# Patient Record
Sex: FEMALE | Race: WHITE | NOT HISPANIC OR LATINO
[De-identification: names, ages, dates, MRNs, and addresses within clinical notes are randomized per-mention and may not be internally consistent; named-entity substitution may affect disease eponyms.]

---

## 2017-01-23 ENCOUNTER — APPOINTMENT (OUTPATIENT)
Dept: SURGERY | Facility: CLINIC | Age: 62
End: 2017-01-23

## 2017-02-07 ENCOUNTER — MEDICATION RENEWAL (OUTPATIENT)
Age: 62
End: 2017-02-07

## 2017-02-08 ENCOUNTER — MEDICATION RENEWAL (OUTPATIENT)
Age: 62
End: 2017-02-08

## 2017-02-08 RX ORDER — FLUTICASONE PROPIONATE 50 UG/1
50 SPRAY, METERED NASAL TWICE DAILY
Qty: 3 | Refills: 1 | Status: DISCONTINUED | COMMUNITY
Start: 2017-02-07 | End: 2017-02-08

## 2017-02-26 ENCOUNTER — RX RENEWAL (OUTPATIENT)
Age: 62
End: 2017-02-26

## 2017-04-03 ENCOUNTER — APPOINTMENT (OUTPATIENT)
Dept: MRI IMAGING | Facility: CLINIC | Age: 62
End: 2017-04-03

## 2017-04-03 ENCOUNTER — OUTPATIENT (OUTPATIENT)
Dept: OUTPATIENT SERVICES | Facility: HOSPITAL | Age: 62
LOS: 1 days | End: 2017-04-03
Payer: MEDICARE

## 2017-04-03 DIAGNOSIS — Z00.8 ENCOUNTER FOR OTHER GENERAL EXAMINATION: ICD-10-CM

## 2017-04-04 PROCEDURE — A9585: CPT

## 2017-04-04 PROCEDURE — C8908: CPT

## 2017-04-04 PROCEDURE — 82565 ASSAY OF CREATININE: CPT

## 2017-04-04 PROCEDURE — C8937: CPT

## 2017-04-06 ENCOUNTER — APPOINTMENT (OUTPATIENT)
Dept: PULMONOLOGY | Facility: CLINIC | Age: 62
End: 2017-04-06

## 2017-04-06 ENCOUNTER — RESULT REVIEW (OUTPATIENT)
Age: 62
End: 2017-04-06

## 2017-04-06 VITALS
OXYGEN SATURATION: 96 % | BODY MASS INDEX: 31.58 KG/M2 | HEART RATE: 74 BPM | RESPIRATION RATE: 17 BRPM | DIASTOLIC BLOOD PRESSURE: 70 MMHG | HEIGHT: 64 IN | SYSTOLIC BLOOD PRESSURE: 120 MMHG | WEIGHT: 185 LBS

## 2017-04-06 RX ORDER — LEVOTHYROXINE SODIUM 112 UG/1
112 CAPSULE ORAL
Qty: 84 | Refills: 0 | Status: ACTIVE | COMMUNITY
Start: 2017-02-26

## 2017-04-06 RX ORDER — AMOXICILLIN 500 MG/1
500 CAPSULE ORAL
Qty: 21 | Refills: 0 | Status: DISCONTINUED | COMMUNITY
Start: 2017-01-28

## 2017-04-07 ENCOUNTER — OUTPATIENT (OUTPATIENT)
Dept: OUTPATIENT SERVICES | Facility: HOSPITAL | Age: 62
LOS: 1 days | End: 2017-04-07
Payer: MEDICARE

## 2017-04-07 ENCOUNTER — APPOINTMENT (OUTPATIENT)
Dept: MRI IMAGING | Facility: CLINIC | Age: 62
End: 2017-04-07

## 2017-04-07 DIAGNOSIS — Z00.8 ENCOUNTER FOR OTHER GENERAL EXAMINATION: ICD-10-CM

## 2017-04-07 PROCEDURE — 77065 DX MAMMO INCL CAD UNI: CPT

## 2017-04-07 PROCEDURE — A9585: CPT

## 2017-04-07 PROCEDURE — A4648: CPT

## 2017-04-07 PROCEDURE — 19085 BX BREAST 1ST LESION MR IMAG: CPT

## 2017-04-07 PROCEDURE — 82565 ASSAY OF CREATININE: CPT

## 2017-04-12 DIAGNOSIS — R92.8 OTHER ABNORMAL AND INCONCLUSIVE FINDINGS ON DIAGNOSTIC IMAGING OF BREAST: ICD-10-CM

## 2017-04-12 DIAGNOSIS — N64.89 OTHER SPECIFIED DISORDERS OF BREAST: ICD-10-CM

## 2017-04-16 ENCOUNTER — RX RENEWAL (OUTPATIENT)
Age: 62
End: 2017-04-16

## 2017-04-20 ENCOUNTER — RX RENEWAL (OUTPATIENT)
Age: 62
End: 2017-04-20

## 2017-06-19 ENCOUNTER — RX RENEWAL (OUTPATIENT)
Age: 62
End: 2017-06-19

## 2017-06-29 ENCOUNTER — APPOINTMENT (OUTPATIENT)
Dept: PULMONOLOGY | Facility: CLINIC | Age: 62
End: 2017-06-29

## 2017-06-29 VITALS
WEIGHT: 192 LBS | HEIGHT: 64 IN | BODY MASS INDEX: 32.78 KG/M2 | RESPIRATION RATE: 16 BRPM | DIASTOLIC BLOOD PRESSURE: 74 MMHG | SYSTOLIC BLOOD PRESSURE: 120 MMHG | OXYGEN SATURATION: 95 % | HEART RATE: 76 BPM

## 2017-06-29 DIAGNOSIS — Z86.39 PERSONAL HISTORY OF OTHER ENDOCRINE, NUTRITIONAL AND METABOLIC DISEASE: ICD-10-CM

## 2017-07-11 ENCOUNTER — RX RENEWAL (OUTPATIENT)
Age: 62
End: 2017-07-11

## 2017-07-12 LAB
CA-I SERPL-SCNC: 1.33 MMOL/L
CALCIUM SERPL-MCNC: 10.3 MG/DL

## 2017-07-13 LAB
CALCIUM SERPL-MCNC: 10.3 MG/DL
PARATHYROID HORMONE INTACT: 18 PG/ML

## 2017-07-19 ENCOUNTER — RESULT REVIEW (OUTPATIENT)
Age: 62
End: 2017-07-19

## 2017-08-09 ENCOUNTER — RX RENEWAL (OUTPATIENT)
Age: 62
End: 2017-08-09

## 2017-08-10 ENCOUNTER — APPOINTMENT (OUTPATIENT)
Dept: PULMONOLOGY | Facility: CLINIC | Age: 62
End: 2017-08-10

## 2017-09-29 ENCOUNTER — TRANSCRIPTION ENCOUNTER (OUTPATIENT)
Age: 62
End: 2017-09-29

## 2017-10-02 ENCOUNTER — RX RENEWAL (OUTPATIENT)
Age: 62
End: 2017-10-02

## 2017-10-06 ENCOUNTER — APPOINTMENT (OUTPATIENT)
Dept: MAMMOGRAPHY | Facility: CLINIC | Age: 62
End: 2017-10-06

## 2017-10-06 ENCOUNTER — APPOINTMENT (OUTPATIENT)
Dept: ULTRASOUND IMAGING | Facility: CLINIC | Age: 62
End: 2017-10-06

## 2017-10-06 ENCOUNTER — OUTPATIENT (OUTPATIENT)
Dept: OUTPATIENT SERVICES | Facility: HOSPITAL | Age: 62
LOS: 1 days | End: 2017-10-06
Payer: MEDICARE

## 2017-10-06 DIAGNOSIS — Z00.8 ENCOUNTER FOR OTHER GENERAL EXAMINATION: ICD-10-CM

## 2017-10-06 PROCEDURE — 77063 BREAST TOMOSYNTHESIS BI: CPT

## 2017-10-06 PROCEDURE — 76641 ULTRASOUND BREAST COMPLETE: CPT | Mod: 26,50

## 2017-10-06 PROCEDURE — 77067 SCR MAMMO BI INCL CAD: CPT

## 2017-10-06 PROCEDURE — G0202: CPT | Mod: 26

## 2017-10-06 PROCEDURE — 77063 BREAST TOMOSYNTHESIS BI: CPT | Mod: 26

## 2017-10-06 PROCEDURE — 76641 ULTRASOUND BREAST COMPLETE: CPT

## 2017-11-06 ENCOUNTER — RX RENEWAL (OUTPATIENT)
Age: 62
End: 2017-11-06

## 2017-11-06 RX ORDER — ALBUTEROL SULFATE 90 UG/1
108 (90 BASE) AEROSOL, METERED RESPIRATORY (INHALATION)
Qty: 18 | Refills: 0 | Status: ACTIVE | COMMUNITY
Start: 2017-11-06 | End: 1900-01-01

## 2017-11-10 ENCOUNTER — APPOINTMENT (OUTPATIENT)
Dept: PULMONOLOGY | Facility: CLINIC | Age: 62
End: 2017-11-10
Payer: MEDICARE

## 2017-11-10 VITALS
SYSTOLIC BLOOD PRESSURE: 126 MMHG | DIASTOLIC BLOOD PRESSURE: 80 MMHG | BODY MASS INDEX: 32.78 KG/M2 | OXYGEN SATURATION: 98 % | WEIGHT: 192 LBS | HEART RATE: 91 BPM | HEIGHT: 64 IN

## 2017-11-10 DIAGNOSIS — Z87.01 PERSONAL HISTORY OF PNEUMONIA (RECURRENT): ICD-10-CM

## 2017-11-10 PROCEDURE — 94010 BREATHING CAPACITY TEST: CPT

## 2017-11-10 PROCEDURE — 99214 OFFICE O/P EST MOD 30 MIN: CPT | Mod: 25

## 2017-11-10 PROCEDURE — 95012 NITRIC OXIDE EXP GAS DETER: CPT

## 2017-11-10 RX ORDER — LEVALBUTEROL TARTRATE 45 UG/1
45 AEROSOL, METERED ORAL
Qty: 3 | Refills: 2 | Status: ACTIVE | COMMUNITY
Start: 2017-11-10 | End: 1900-01-01

## 2017-11-15 ENCOUNTER — RX RENEWAL (OUTPATIENT)
Age: 62
End: 2017-11-15

## 2018-01-18 ENCOUNTER — RX RENEWAL (OUTPATIENT)
Age: 63
End: 2018-01-18

## 2018-01-22 ENCOUNTER — APPOINTMENT (OUTPATIENT)
Dept: SURGERY | Facility: CLINIC | Age: 63
End: 2018-01-22
Payer: MEDICARE

## 2018-01-22 PROCEDURE — 99213K: CUSTOM

## 2018-02-14 ENCOUNTER — RX RENEWAL (OUTPATIENT)
Age: 63
End: 2018-02-14

## 2018-03-07 ENCOUNTER — APPOINTMENT (OUTPATIENT)
Dept: PULMONOLOGY | Facility: CLINIC | Age: 63
End: 2018-03-07
Payer: MEDICARE

## 2018-03-07 VITALS
BODY MASS INDEX: 34.2 KG/M2 | HEART RATE: 73 BPM | DIASTOLIC BLOOD PRESSURE: 70 MMHG | HEIGHT: 63 IN | WEIGHT: 193 LBS | RESPIRATION RATE: 14 BRPM | SYSTOLIC BLOOD PRESSURE: 110 MMHG | OXYGEN SATURATION: 96 %

## 2018-03-07 DIAGNOSIS — Z87.09 PERSONAL HISTORY OF OTHER DISEASES OF THE RESPIRATORY SYSTEM: ICD-10-CM

## 2018-03-07 PROCEDURE — 99214 OFFICE O/P EST MOD 30 MIN: CPT

## 2018-03-07 RX ORDER — METRONIDAZOLE 7.5 MG/G
0.75 GEL VAGINAL
Qty: 210 | Refills: 0 | Status: COMPLETED | COMMUNITY
Start: 2017-11-10

## 2018-03-07 RX ORDER — OSELTAMIVIR PHOSPHATE 75 MG/1
75 CAPSULE ORAL
Qty: 10 | Refills: 0 | Status: COMPLETED | COMMUNITY
Start: 2018-01-23

## 2018-03-08 ENCOUNTER — RX RENEWAL (OUTPATIENT)
Age: 63
End: 2018-03-08

## 2018-03-16 ENCOUNTER — APPOINTMENT (OUTPATIENT)
Dept: PULMONOLOGY | Facility: CLINIC | Age: 63
End: 2018-03-16
Payer: MEDICARE

## 2018-03-16 VITALS
OXYGEN SATURATION: 93 % | WEIGHT: 191 LBS | SYSTOLIC BLOOD PRESSURE: 115 MMHG | BODY MASS INDEX: 33.84 KG/M2 | HEIGHT: 63 IN | DIASTOLIC BLOOD PRESSURE: 65 MMHG | RESPIRATION RATE: 14 BRPM | HEART RATE: 71 BPM

## 2018-03-16 PROCEDURE — 99214 OFFICE O/P EST MOD 30 MIN: CPT | Mod: 25

## 2018-03-16 PROCEDURE — 94010 BREATHING CAPACITY TEST: CPT

## 2018-03-16 RX ORDER — CLARITHROMYCIN 500 MG/1
500 TABLET, FILM COATED ORAL
Qty: 20 | Refills: 0 | Status: DISCONTINUED | COMMUNITY
Start: 2018-03-07 | End: 2018-03-16

## 2018-03-16 RX ORDER — PREDNISONE 10 MG/1
10 TABLET ORAL
Qty: 1 | Refills: 0 | Status: DISCONTINUED | COMMUNITY
Start: 2018-03-07 | End: 2018-03-16

## 2018-03-16 RX ORDER — AZITHROMYCIN 250 MG/1
250 TABLET, FILM COATED ORAL
Qty: 4 | Refills: 0 | Status: DISCONTINUED | COMMUNITY
Start: 2017-06-23 | End: 2018-03-16

## 2018-03-16 RX ORDER — PREDNISONE 10 MG/1
10 TABLET ORAL
Qty: 1 | Refills: 0 | Status: DISCONTINUED | COMMUNITY
Start: 2017-06-29 | End: 2018-03-16

## 2018-03-16 RX ORDER — CEFUROXIME AXETIL 500 MG/1
500 TABLET ORAL
Qty: 20 | Refills: 0 | Status: DISCONTINUED | COMMUNITY
Start: 2017-06-23 | End: 2018-03-16

## 2018-03-16 RX ORDER — NITROFURANTOIN (MONOHYDRATE/MACROCRYSTALS) 25; 75 MG/1; MG/1
100 CAPSULE ORAL
Qty: 14 | Refills: 0 | Status: DISCONTINUED | COMMUNITY
Start: 2017-05-23 | End: 2018-03-16

## 2018-04-09 ENCOUNTER — APPOINTMENT (OUTPATIENT)
Dept: MRI IMAGING | Facility: CLINIC | Age: 63
End: 2018-04-09

## 2018-04-09 ENCOUNTER — OUTPATIENT (OUTPATIENT)
Dept: OUTPATIENT SERVICES | Facility: HOSPITAL | Age: 63
LOS: 1 days | End: 2018-04-09
Payer: MEDICARE

## 2018-04-09 DIAGNOSIS — Z00.8 ENCOUNTER FOR OTHER GENERAL EXAMINATION: ICD-10-CM

## 2018-04-09 PROCEDURE — 77059 MRI BREAST BILATERAL: CPT | Mod: 26

## 2018-04-09 PROCEDURE — 0159T: CPT | Mod: 26

## 2018-04-09 PROCEDURE — C8937: CPT

## 2018-04-09 PROCEDURE — C8908: CPT

## 2018-04-09 PROCEDURE — A9585: CPT

## 2018-04-12 ENCOUNTER — RX RENEWAL (OUTPATIENT)
Age: 63
End: 2018-04-12

## 2018-04-30 ENCOUNTER — RX RENEWAL (OUTPATIENT)
Age: 63
End: 2018-04-30

## 2018-06-12 ENCOUNTER — RX RENEWAL (OUTPATIENT)
Age: 63
End: 2018-06-12

## 2018-06-25 ENCOUNTER — RX RENEWAL (OUTPATIENT)
Age: 63
End: 2018-06-25

## 2018-06-27 ENCOUNTER — RX RENEWAL (OUTPATIENT)
Age: 63
End: 2018-06-27

## 2018-06-27 RX ORDER — LEVALBUTEROL TARTRATE 45 UG/1
45 AEROSOL, METERED ORAL
Qty: 45 | Refills: 2 | Status: ACTIVE | COMMUNITY
Start: 2018-06-27 | End: 1900-01-01

## 2018-07-13 ENCOUNTER — APPOINTMENT (OUTPATIENT)
Dept: PULMONOLOGY | Facility: CLINIC | Age: 63
End: 2018-07-13
Payer: MEDICARE

## 2018-07-13 ENCOUNTER — NON-APPOINTMENT (OUTPATIENT)
Age: 63
End: 2018-07-13

## 2018-07-13 VITALS
HEART RATE: 84 BPM | BODY MASS INDEX: 34.55 KG/M2 | DIASTOLIC BLOOD PRESSURE: 70 MMHG | WEIGHT: 195 LBS | HEIGHT: 63 IN | OXYGEN SATURATION: 92 % | SYSTOLIC BLOOD PRESSURE: 120 MMHG

## 2018-07-13 PROCEDURE — 99214 OFFICE O/P EST MOD 30 MIN: CPT | Mod: 25

## 2018-07-13 PROCEDURE — 94010 BREATHING CAPACITY TEST: CPT

## 2018-07-13 PROCEDURE — 95012 NITRIC OXIDE EXP GAS DETER: CPT

## 2018-07-18 ENCOUNTER — RX RENEWAL (OUTPATIENT)
Age: 63
End: 2018-07-18

## 2018-07-18 ENCOUNTER — MEDICATION RENEWAL (OUTPATIENT)
Age: 63
End: 2018-07-18

## 2018-08-27 ENCOUNTER — RX RENEWAL (OUTPATIENT)
Age: 63
End: 2018-08-27

## 2018-10-01 ENCOUNTER — APPOINTMENT (OUTPATIENT)
Dept: ULTRASOUND IMAGING | Facility: CLINIC | Age: 63
End: 2018-10-01
Payer: MEDICARE

## 2018-10-01 ENCOUNTER — OUTPATIENT (OUTPATIENT)
Dept: OUTPATIENT SERVICES | Facility: HOSPITAL | Age: 63
LOS: 1 days | End: 2018-10-01
Payer: MEDICARE

## 2018-10-01 ENCOUNTER — APPOINTMENT (OUTPATIENT)
Dept: MAMMOGRAPHY | Facility: CLINIC | Age: 63
End: 2018-10-01
Payer: MEDICARE

## 2018-10-01 DIAGNOSIS — Z00.8 ENCOUNTER FOR OTHER GENERAL EXAMINATION: ICD-10-CM

## 2018-10-01 PROCEDURE — 76641 ULTRASOUND BREAST COMPLETE: CPT

## 2018-10-01 PROCEDURE — 77063 BREAST TOMOSYNTHESIS BI: CPT | Mod: 26

## 2018-10-01 PROCEDURE — 76641 ULTRASOUND BREAST COMPLETE: CPT | Mod: 26,50

## 2018-10-01 PROCEDURE — 77063 BREAST TOMOSYNTHESIS BI: CPT

## 2018-10-01 PROCEDURE — 77067 SCR MAMMO BI INCL CAD: CPT

## 2018-10-01 PROCEDURE — 77067 SCR MAMMO BI INCL CAD: CPT | Mod: 26

## 2018-10-18 ENCOUNTER — APPOINTMENT (OUTPATIENT)
Dept: PAIN MANAGEMENT | Facility: CLINIC | Age: 63
End: 2018-10-18
Payer: MEDICARE

## 2018-10-18 VITALS
BODY MASS INDEX: 34.73 KG/M2 | WEIGHT: 196 LBS | HEART RATE: 88 BPM | HEIGHT: 63 IN | DIASTOLIC BLOOD PRESSURE: 77 MMHG | SYSTOLIC BLOOD PRESSURE: 126 MMHG

## 2018-10-18 PROCEDURE — 99204 OFFICE O/P NEW MOD 45 MIN: CPT

## 2018-10-22 ENCOUNTER — APPOINTMENT (OUTPATIENT)
Dept: PULMONOLOGY | Facility: CLINIC | Age: 63
End: 2018-10-22
Payer: MEDICARE

## 2018-10-22 VITALS
WEIGHT: 193 LBS | DIASTOLIC BLOOD PRESSURE: 58 MMHG | RESPIRATION RATE: 17 BRPM | SYSTOLIC BLOOD PRESSURE: 138 MMHG | HEART RATE: 88 BPM | OXYGEN SATURATION: 97 % | HEIGHT: 64 IN | BODY MASS INDEX: 32.95 KG/M2

## 2018-10-22 PROCEDURE — 99214 OFFICE O/P EST MOD 30 MIN: CPT | Mod: 25

## 2018-10-22 PROCEDURE — 71046 X-RAY EXAM CHEST 2 VIEWS: CPT

## 2018-11-05 ENCOUNTER — APPOINTMENT (OUTPATIENT)
Dept: PULMONOLOGY | Facility: CLINIC | Age: 63
End: 2018-11-05

## 2018-11-16 ENCOUNTER — APPOINTMENT (OUTPATIENT)
Dept: PULMONOLOGY | Facility: CLINIC | Age: 63
End: 2018-11-16
Payer: MEDICARE

## 2018-11-16 ENCOUNTER — NON-APPOINTMENT (OUTPATIENT)
Age: 63
End: 2018-11-16

## 2018-11-16 VITALS
OXYGEN SATURATION: 95 % | DIASTOLIC BLOOD PRESSURE: 70 MMHG | HEART RATE: 90 BPM | WEIGHT: 195 LBS | HEIGHT: 64 IN | SYSTOLIC BLOOD PRESSURE: 120 MMHG | BODY MASS INDEX: 33.29 KG/M2

## 2018-11-16 PROCEDURE — 99214 OFFICE O/P EST MOD 30 MIN: CPT | Mod: 25

## 2018-11-16 PROCEDURE — 94010 BREATHING CAPACITY TEST: CPT

## 2018-11-16 PROCEDURE — 95012 NITRIC OXIDE EXP GAS DETER: CPT

## 2018-12-06 ENCOUNTER — APPOINTMENT (OUTPATIENT)
Dept: PAIN MANAGEMENT | Facility: CLINIC | Age: 63
End: 2018-12-06

## 2018-12-18 ENCOUNTER — RX RENEWAL (OUTPATIENT)
Age: 63
End: 2018-12-18

## 2019-01-16 ENCOUNTER — RX RENEWAL (OUTPATIENT)
Age: 64
End: 2019-01-16

## 2019-01-28 ENCOUNTER — APPOINTMENT (OUTPATIENT)
Dept: SURGERY | Facility: CLINIC | Age: 64
End: 2019-01-28
Payer: MEDICARE

## 2019-01-28 PROCEDURE — 99213K: CUSTOM

## 2019-02-19 ENCOUNTER — RX RENEWAL (OUTPATIENT)
Age: 64
End: 2019-02-19

## 2019-03-04 ENCOUNTER — RX RENEWAL (OUTPATIENT)
Age: 64
End: 2019-03-04

## 2019-03-15 ENCOUNTER — APPOINTMENT (OUTPATIENT)
Dept: PULMONOLOGY | Facility: CLINIC | Age: 64
End: 2019-03-15
Payer: MEDICARE

## 2019-03-15 ENCOUNTER — NON-APPOINTMENT (OUTPATIENT)
Age: 64
End: 2019-03-15

## 2019-03-15 VITALS
BODY MASS INDEX: 33.29 KG/M2 | SYSTOLIC BLOOD PRESSURE: 122 MMHG | HEIGHT: 64 IN | DIASTOLIC BLOOD PRESSURE: 72 MMHG | HEART RATE: 77 BPM | OXYGEN SATURATION: 94 % | RESPIRATION RATE: 17 BRPM | WEIGHT: 195 LBS

## 2019-03-15 PROCEDURE — 94010 BREATHING CAPACITY TEST: CPT

## 2019-03-15 PROCEDURE — 99214 OFFICE O/P EST MOD 30 MIN: CPT | Mod: 25

## 2019-03-15 NOTE — ADDENDUM
[FreeTextEntry1] : All medical record entries made by shlomo Wilkinson were at Dr. Korey Bautista's, direction and personally dictated by me on 03/15/2019. I have reviewed the chart and agree that the record accurately reflects my personal performance of the history, physical exam, assessment and plan. I have also personally directed, reviewed, and agree with the discharge instructions.

## 2019-03-15 NOTE — ASSESSMENT
[FreeTextEntry1] : Ms. Smith has allergic (IgE mediated) and eosinophilic asthma, allergies, OW, urticaria, GERD, and OSAS. She is now s/p acute sinusitis. She is currently stable from a pulmonary perspective except for poor sleep. \par \par problem 1: moderate persistent asthma (flair) \par -continue Levalbuterol via Hand held nebulizer, Q6H\par -continue Advair  at 2 inhalations BID\par -continue to use Spiriva 1 inhalation QD\par -continue Qvar Redihaler 80 2 puffs BID \par -continue to use Singulair 10 mg before bed \par -rescue inhaler PRN \par \par Asthma is believed to be caused by inherited (genetic) and environmental factor, but its exact cause is unknown. Asthma may be triggered by allergens, lung infections, or irritants in the air. Asthma triggers are different for each person \par -Inhaler technique reviewed as well as oral hygiene techniques reviewed with patient. Avoidance of cold air, extremes of temperature, rescue inhaler should be used before exercise. Order of medication reviewed with patient. Recommended use of a cool mist humidifier in the bedroom. \par \par problem 2: eosinophilic asthma \par -Candidate for Nucala / Fasenra\par -The safety and efficacy of Nucala was established in three double-blind, randomized, placebo-controlled trials in patients with severe asthma. Compared to a placebo, patients with severe asthma receiving Nucala had fewer exacerbation requiring hospitalization and/or emergency department visits, and a longer time to first exacerbation. In addition, patients with severe asthma receiving Nucala or Fasenra experienced greater reductions in their daily maintenance oral corticosteroid dose, while maintaining asthma control compared with patients receiving placebo. Treatment with Nucala did not result in a significant improvement in lung function, as measured by the volume of air exhaled by patients in one second. The most common side effects include: headache, injection site reactions, back pain, weakness, and fatigue; hypersensitivity reactions can occur within hours or days including swelling of the face, mouth, and tongue, fainting, dizziness, hives, breathing problems, and rash; herpes zoster infections have occurred. The drug is a monoclonal antibody that inhibits interleukin -5 which helps regular eosinophils, a type of white blood cell that contributes to asthma. The over-production of eosinophils can cause inflammation in the lungs, increasing the frequency of asthma attacks. Patients must also take other medications, including high dose inhaled corticosteroids and at least one additional asthma drug\par \par problem 3: allergic (IgE mediated) asthma\par -continue to use Xolair 225 q monthly\par -Xolair is a recombinant DNA- derived humanized IgG1K monoclonal antibody that selectively binds ot human immunoglobulin E (IgE). Xolair is produced by a Chinese hamster ovary cell suspension culture in nutrient medium containing the antibiotic gentamicin. Gentamicin is not detectable in the final product. Xolair is a sterile, white, preservative free, lyophilized powder contained in a single use vial that is reconstituted with sterile water for suspension. Side effects include: wheezing, tightness of the chest, trouble breathing, hives, skin rash, feeling anxious or light-headed, fainting, warmth or tingling under skin, or swelling of face, lips, or tongue \par \par problem 4: GERD\par - Add Zantac 300 mg QHS\par -continue generic Nexium 40 mg before breakfast\par -Rule of 2s: avoid eating too much, eating too late, eating too spicy, eating two hours before bed\par -Things to avoid including overeating, spicy foods, tight clothing, eating within three hours of bed, this list is not all inclusive. \par -For treatment of reflux, possible options discussed including diet control, H2 blockers, PPIs, as well as coating motility agents discussed as treatment options. Timing of meals and proximity of last meal to sleep were discussed. If symptoms persist, a formal gastrointestinal evaluation is needed. \par \par problem 5: allergic rhinitis\par -Olopatadine 0.6% 1 sniff each nostril BID \par -continue to use Veramyst 1 sniff each nostril BID\par -continue Xyzal 5 mg before bed \par \par -Environmental measures for allergies were encouraged including mattress and pillow cover, air purifier, and environmental controls. \par \par problem 6: KRISTA\par -continue to use CPAP machine - tolerating it well and seeing benefits\par -add Insomnitol \par \par -Sleep apnea is associated with adverse clinical consequences which an affect most organ systems. Cardiovascular disease risk includes arrhythmias, atrial fibrillation, hypertension, coronary artery disease, and stroke. Metabolic disorders include diabetes type 2, non-alcoholic fatty liver disease. Mood disorder especially depression; and cognitive decline especially in the elderly. Associations with chronic reflux/Dennis’s esophagus some but not all inclusive. \par -Reasons to assess this include arousal consistent with hypopnea; respiratory events most prominent in REM sleep or supine position; therefore sleep staging and body position are important for accurate diagnosis and estimation of AHI. \par \par problem 7: leg pain (improved)\par -recommended to continue f/u with Dr. Cheyanne Yousif\par -recommended continue to use L-carnitine\par -being added CoQ-10 at 200 mg\par -being added vitamin D 50,000 units once a month\par \par problem 8: obesity\par -Weight loss, exercise, and diet control were discussed and are highly encouraged. Treatment options were given such as, aqua therapy, and contacting a nutritionist. Recommended to use the elliptical, stationary bike, less use of treadmill. Obesity is associated with worsening asthma, shortness of breath, and potential for cardiac disease, diabetes, and other underlying medical conditions. \par -recommended to consider yoga\par \par problem 9: idiopathic urticaria\par - It is recommended that she hold all of her supplements and then restart them one at a time. \par -continue to use Xolair 225 q monthly \par \par problem 10: fatigue\par -recommended to go gluten free\par -recommended to look at number of hours she is sleeping as well as her CPAP pressures \par \par problem 11: health maintenance \par -s/p flu shot - 2018\par -recommended strep pneumonia vaccines: Prevnar-13 vaccine, followed by Pneumo vaccine 23 one year following\par -recommended early intervention for URIs\par -recommended regular osteoporosis evaluations\par -recommended early dermatological evaluations\par -recommended after the age of 50 to the age of 70, colonoscopy every 5 years \par  \par \par F/U in 4 months\par She is encouraged to call with any changes, concerns, or questions. \par

## 2019-03-15 NOTE — PROCEDURE
[FreeTextEntry1] : PFT - spi reveals normal flows; FEV1 is 2.11 which is 85% of predicted, normal flow volume loop \par \par Blood work (November 20180 reveals Eosinophil count = 350 (35%)

## 2019-03-15 NOTE — REASON FOR VISIT
[Follow-Up] : a follow-up visit [FreeTextEntry1] : acid reflux disease, allergic rhinitis, asthma, cough, low vitamin D, obesity, KRISTA, and sore throat

## 2019-03-18 ENCOUNTER — RX RENEWAL (OUTPATIENT)
Age: 64
End: 2019-03-18

## 2019-04-10 ENCOUNTER — APPOINTMENT (OUTPATIENT)
Dept: MRI IMAGING | Facility: CLINIC | Age: 64
End: 2019-04-10
Payer: MEDICARE

## 2019-04-10 ENCOUNTER — OUTPATIENT (OUTPATIENT)
Dept: OUTPATIENT SERVICES | Facility: HOSPITAL | Age: 64
LOS: 1 days | End: 2019-04-10
Payer: MEDICARE

## 2019-04-10 DIAGNOSIS — Z00.8 ENCOUNTER FOR OTHER GENERAL EXAMINATION: ICD-10-CM

## 2019-04-10 PROCEDURE — 77049 MRI BREAST C-+ W/CAD BI: CPT | Mod: 26

## 2019-04-10 PROCEDURE — C8937: CPT

## 2019-04-10 PROCEDURE — C8908: CPT

## 2019-04-10 PROCEDURE — A9585: CPT

## 2019-05-13 ENCOUNTER — RX RENEWAL (OUTPATIENT)
Age: 64
End: 2019-05-13

## 2019-06-10 ENCOUNTER — RX RENEWAL (OUTPATIENT)
Age: 64
End: 2019-06-10

## 2019-07-11 ENCOUNTER — APPOINTMENT (OUTPATIENT)
Dept: PULMONOLOGY | Facility: CLINIC | Age: 64
End: 2019-07-11
Payer: MEDICARE

## 2019-07-11 ENCOUNTER — NON-APPOINTMENT (OUTPATIENT)
Age: 64
End: 2019-07-11

## 2019-07-11 VITALS
DIASTOLIC BLOOD PRESSURE: 64 MMHG | HEART RATE: 79 BPM | HEIGHT: 64 IN | WEIGHT: 193 LBS | SYSTOLIC BLOOD PRESSURE: 118 MMHG | BODY MASS INDEX: 32.95 KG/M2 | OXYGEN SATURATION: 94 %

## 2019-07-11 PROCEDURE — 99214 OFFICE O/P EST MOD 30 MIN: CPT | Mod: 25

## 2019-07-11 PROCEDURE — 95012 NITRIC OXIDE EXP GAS DETER: CPT

## 2019-07-11 NOTE — REASON FOR VISIT
[Follow-Up] : a follow-up visit [FreeTextEntry1] : allergic rhinitis, asthma, GERD, obesity, low vitamin D, KRISTA, eosinophilic asthma, and cough

## 2019-07-11 NOTE — PROCEDURE
[FreeTextEntry1] : PFT - spi reveals normal flows; FEV1 is 2.19 which is 89% of predicted, normal flow volume loop \par \par FENO was 25 ; a normal value being less than 25\par \par Fractional exhaled nitric oxide (FENO) is regarded as a simple, noninvasive method for assessing eosinophilic airway inflammation. Produced by a variety of cells within the lung, nitric oxide (NO) concentrations are generally low in healthy individuals. However, high concentrations of NO appear to be involved in nonspecific host defense mechanisms and chronic inflammatory diseases such as asthma. The American Thoracic Society (ATS) therefore has recommended using FENO to aid in the diagnosis and monitoring of eosinophilic airway inflammation and asthma, and for identifying steroid responsive individuals whose chronic respiratory symptoms may be caused by airway inflammation.

## 2019-07-11 NOTE — ASSESSMENT
[FreeTextEntry1] : Ms. Smith has allergic (IgE mediated) and eosinophilic asthma, allergies, OW, urticaria, GERD, and OSAS.  She is currently stable from a pulmonary perspective except for poor sleep. \par \par problem 1: moderate persistent asthma \par -continue Levalbuterol via Hand held nebulizer, Q6H\par -continue Advair  at 2 inhalations BID\par -continue to use Spiriva 1 inhalation QD\par -continue Qvar Redihaler 80 2 puffs BID \par -continue to use Singulair 10 mg before bed \par -rescue inhaler PRN \par \par Asthma is believed to be caused by inherited (genetic) and environmental factor, but its exact cause is unknown. Asthma may be triggered by allergens, lung infections, or irritants in the air. Asthma triggers are different for each person \par -Inhaler technique reviewed as well as oral hygiene techniques reviewed with patient. Avoidance of cold air, extremes of temperature, rescue inhaler should be used before exercise. Order of medication reviewed with patient. Recommended use of a cool mist humidifier in the bedroom. \par \par problem 2: eosinophilic asthma \par -Candidate for Nucala / Fasenra\par -The safety and efficacy of Nucala was established in three double-blind, randomized, placebo-controlled trials in patients with severe asthma. Compared to a placebo, patients with severe asthma receiving Nucala had fewer exacerbation requiring hospitalization and/or emergency department visits, and a longer time to first exacerbation. In addition, patients with severe asthma receiving Nucala or Fasenra experienced greater reductions in their daily maintenance oral corticosteroid dose, while maintaining asthma control compared with patients receiving placebo. Treatment with Nucala did not result in a significant improvement in lung function, as measured by the volume of air exhaled by patients in one second. The most common side effects include: headache, injection site reactions, back pain, weakness, and fatigue; hypersensitivity reactions can occur within hours or days including swelling of the face, mouth, and tongue, fainting, dizziness, hives, breathing problems, and rash; herpes zoster infections have occurred. The drug is a monoclonal antibody that inhibits interleukin -5 which helps regular eosinophils, a type of white blood cell that contributes to asthma. The over-production of eosinophils can cause inflammation in the lungs, increasing the frequency of asthma attacks. Patients must also take other medications, including high dose inhaled corticosteroids and at least one additional asthma drug\par \par problem 3: allergic (IgE mediated) asthma\par -continue to use Xolair 225 q monthly\par -Xolair is a recombinant DNA- derived humanized IgG1K monoclonal antibody that selectively binds ot human immunoglobulin E (IgE). Xolair is produced by a Chinese hamster ovary cell suspension culture in nutrient medium containing the antibiotic gentamicin. Gentamicin is not detectable in the final product. Xolair is a sterile, white, preservative free, lyophilized powder contained in a single use vial that is reconstituted with sterile water for suspension. Side effects include: wheezing, tightness of the chest, trouble breathing, hives, skin rash, feeling anxious or light-headed, fainting, warmth or tingling under skin, or swelling of face, lips, or tongue \par \par problem 4: GERD\par - Add Zantac 300 mg QHS\par -continue generic Nexium 40 mg before breakfast\par -Rule of 2s: avoid eating too much, eating too late, eating too spicy, eating two hours before bed\par -Things to avoid including overeating, spicy foods, tight clothing, eating within three hours of bed, this list is not all inclusive. \par -For treatment of reflux, possible options discussed including diet control, H2 blockers, PPIs, as well as coating motility agents discussed as treatment options. Timing of meals and proximity of last meal to sleep were discussed. If symptoms persist, a formal gastrointestinal evaluation is needed. \par \par problem 5: allergic rhinitis\par -Olopatadine 0.6% 1 sniff each nostril BID \par -continue to use Veramyst 1 sniff each nostril BID\par -continue Xyzal 5 mg before bed \par \par -Environmental measures for allergies were encouraged including mattress and pillow cover, air purifier, and environmental controls. \par \par problem 6: KRISTA\par -continue to use CPAP machine - tolerating it well and seeing benefits\par -recommended to use Insomnitol prn\par \par -Sleep apnea is associated with adverse clinical consequences which an affect most organ systems. Cardiovascular disease risk includes arrhythmias, atrial fibrillation, hypertension, coronary artery disease, and stroke. Metabolic disorders include diabetes type 2, non-alcoholic fatty liver disease. Mood disorder especially depression; and cognitive decline especially in the elderly. Associations with chronic reflux/Dennis’s esophagus some but not all inclusive. \par -Reasons to assess this include arousal consistent with hypopnea; respiratory events most prominent in REM sleep or supine position; therefore sleep staging and body position are important for accurate diagnosis and estimation of AHI. \par \par problem 7: leg pain (improved)\par -recommended to continue f/u with Dr. Cheyanne Yousif\par -recommended continue to use L-carnitine\par -being added CoQ-10 at 200 mg\par -being added vitamin D 50,000 units once a month\par \par problem 8: obesity\par -Weight loss, exercise, and diet control were discussed and are highly encouraged. Treatment options were given such as, aqua therapy, and contacting a nutritionist. Recommended to use the elliptical, stationary bike, less use of treadmill. Obesity is associated with worsening asthma, shortness of breath, and potential for cardiac disease, diabetes, and other underlying medical conditions. \par -recommended to consider yoga\par \par problem 9: idiopathic urticaria\par - It is recommended that she hold all of her supplements and then restart them one at a time. \par -continue to use Xolair 225 q monthly \par \par problem 10: fatigue\par -recommended to go gluten free\par -recommended to look at number of hours she is sleeping as well as her CPAP pressures \par \par problem 11: health maintenance \par -s/p flu shot - 2018\par -recommended strep pneumonia vaccines: Prevnar-13 vaccine, followed by Pneumo vaccine 23 one year following\par -recommended early intervention for URIs\par -recommended regular osteoporosis evaluations\par -recommended early dermatological evaluations\par -recommended after the age of 50 to the age of 70, colonoscopy every 5 years \par  \par \par F/U in 4 months - SPI / NIOX\par She is encouraged to call with any changes, concerns, or questions.

## 2019-07-11 NOTE — HISTORY OF PRESENT ILLNESS
[FreeTextEntry1] : Ms. Smith is a 63 year old female with a history of allergic rhinitis, asthma, GERD, obesity, low vitamin D, KRISTA, eosinophilic asthma, and cough presenting to the office today for a follow up visit. Her chief complaint is difficulty falling asleep. \par -She states that she has generally been feeling well\par -she states that her energy level has been good. she frequently has difficulty falling asleep, although when she is able to get to sleep she can typically stay asleep. she sleeps about 8 hours per night. she usually does not wake up during the night to urinate\par -she states that she has been following up with Dr. Anderson for her digestive issues. \par -she rates her energy level a 6-7 / 10\par -she has been using Nexium and Pepcid to control her heartburn\par -she has been trying to walk more frequently for exercise\par -she has been using all of her inhalers regularly\par -she denies any headaches, nausea, vomiting, fever, chills, sweats, chest pain, chest pressure, diarrhea, constipation, dysphagia, dizziness, leg swelling, leg pain, itchy eyes, itchy ears, heartburn, reflux, or sour taste in the mouth, hoarseness, wheezing.

## 2019-07-11 NOTE — PHYSICAL EXAM
[General Appearance - Well Developed] : well developed [Normal Appearance] : normal appearance [No Deformities] : no deformities [Well Groomed] : well groomed [General Appearance - Well Nourished] : well nourished [General Appearance - In No Acute Distress] : no acute distress [Normal Conjunctiva] : the conjunctiva exhibited no abnormalities [Normal Oropharynx] : normal oropharynx [Eyelids - No Xanthelasma] : the eyelids demonstrated no xanthelasmas [Neck Appearance] : the appearance of the neck was normal [Neck Cervical Mass (___cm)] : no neck mass was observed [Thyroid Diffuse Enlargement] : the thyroid was not enlarged [Thyroid Nodule] : there were no palpable thyroid nodules [Jugular Venous Distention Increased] : there was no jugular-venous distention [Heart Sounds] : normal S1 and S2 [Heart Rate And Rhythm] : heart rate and rhythm were normal [Murmurs] : no murmurs present [Respiration, Rhythm And Depth] : normal respiratory rhythm and effort [Exaggerated Use Of Accessory Muscles For Inspiration] : no accessory muscle use [Abdomen Soft] : soft [Auscultation Breath Sounds / Voice Sounds] : lungs were clear to auscultation bilaterally [Abdomen Tenderness] : non-tender [Abdomen Mass (___ Cm)] : no abdominal mass palpated [Gait - Sufficient For Exercise Testing] : the gait was sufficient for exercise testing [Nail Clubbing] : no clubbing of the fingernails [Abnormal Walk] : normal gait [Petechial Hemorrhages (___cm)] : no petechial hemorrhages [Cyanosis, Localized] : no localized cyanosis [] : no ischemic changes [Skin Color & Pigmentation] : normal skin color and pigmentation [Skin Lesions] : no skin lesions [No Skin Ulcers] : no skin ulcer [No Venous Stasis] : no venous stasis [No Xanthoma] : no  xanthoma was observed [Deep Tendon Reflexes (DTR)] : deep tendon reflexes were 2+ and symmetric [No Focal Deficits] : no focal deficits [Sensation] : the sensory exam was normal to light touch and pinprick [Affect] : the affect was normal [Oriented To Time, Place, And Person] : oriented to person, place, and time [Impaired Insight] : insight and judgment were intact [II] : II [FreeTextEntry1] : I:E ratio 1:3; clear

## 2019-07-11 NOTE — ADDENDUM
[FreeTextEntry1] : All medical record entries made by shlomo Wilkinson were at Dr. Korey Bautista's, direction and personally dictated by me on 07/11/2019. I have reviewed the chart and agree that the record accurately reflects my personal performance of the history, physical exam, assessment and plan. I have also personally directed, reviewed, and agree with the discharge instructions.

## 2019-07-23 ENCOUNTER — RX RENEWAL (OUTPATIENT)
Age: 64
End: 2019-07-23

## 2019-10-03 NOTE — HISTORY OF PRESENT ILLNESS
[FreeTextEntry1] : Ms. Smith is a 63 year old female with a history of acid reflux disease, allergic rhinitis, asthma, cough, low vitamin D, obesity, KRISTA, and sore throat presenting to the office today for a follow up visit. Her chief complaint is poor sleep. \par -She states that she has generally been feeling well \par -she states that her breathing is typically better when she uses both her Advair and Qvar inhalers\par -she notes that she never typically sleeps well, as it takes her a long time to fall asleep. she typically will go to bed at 12 but not fall asleep until 3. in total she typically gets about 6-7 hours of sleep nightly, and she goes not typically move around a lot while sleeping\par -she reports that her weight has been fluctuating\par -she notes that she has been using CBD and Hemp oil for muscle pains. these oils do not help with falling asleep\par -she does not believe that anxiety is the reason for her inability to fall asleep at night \par -she has been using her CPAP machine regularly\par -she denies any headaches, nausea, vomiting, fever, chills, sweats, chest pain, chest pressure, diarrhea, constipation, dysphagia, dizziness, leg swelling, leg pain, itchy eyes, itchy ears, heartburn, reflux, or sour taste in the mouth. 
04-Oct-2019 01:28

## 2019-10-07 ENCOUNTER — APPOINTMENT (OUTPATIENT)
Dept: ULTRASOUND IMAGING | Facility: CLINIC | Age: 64
End: 2019-10-07
Payer: MEDICARE

## 2019-10-07 ENCOUNTER — OUTPATIENT (OUTPATIENT)
Dept: OUTPATIENT SERVICES | Facility: HOSPITAL | Age: 64
LOS: 1 days | End: 2019-10-07
Payer: MEDICARE

## 2019-10-07 ENCOUNTER — APPOINTMENT (OUTPATIENT)
Dept: MAMMOGRAPHY | Facility: CLINIC | Age: 64
End: 2019-10-07
Payer: MEDICARE

## 2019-10-07 DIAGNOSIS — Z00.8 ENCOUNTER FOR OTHER GENERAL EXAMINATION: ICD-10-CM

## 2019-10-07 PROCEDURE — 76641 ULTRASOUND BREAST COMPLETE: CPT | Mod: 26,50

## 2019-10-07 PROCEDURE — 77063 BREAST TOMOSYNTHESIS BI: CPT

## 2019-10-07 PROCEDURE — G0279: CPT

## 2019-10-07 PROCEDURE — G0279: CPT | Mod: 26

## 2019-10-07 PROCEDURE — 77066 DX MAMMO INCL CAD BI: CPT

## 2019-10-07 PROCEDURE — 77066 DX MAMMO INCL CAD BI: CPT | Mod: 26

## 2019-10-07 PROCEDURE — 77067 SCR MAMMO BI INCL CAD: CPT

## 2019-10-07 PROCEDURE — 76641 ULTRASOUND BREAST COMPLETE: CPT

## 2019-11-08 ENCOUNTER — APPOINTMENT (OUTPATIENT)
Dept: PULMONOLOGY | Facility: CLINIC | Age: 64
End: 2019-11-08
Payer: MEDICARE

## 2019-11-08 ENCOUNTER — NON-APPOINTMENT (OUTPATIENT)
Age: 64
End: 2019-11-08

## 2019-11-08 VITALS
HEIGHT: 64 IN | RESPIRATION RATE: 17 BRPM | OXYGEN SATURATION: 96 % | SYSTOLIC BLOOD PRESSURE: 130 MMHG | WEIGHT: 195 LBS | HEART RATE: 89 BPM | DIASTOLIC BLOOD PRESSURE: 80 MMHG | BODY MASS INDEX: 33.29 KG/M2

## 2019-11-08 PROCEDURE — 99214 OFFICE O/P EST MOD 30 MIN: CPT | Mod: 25

## 2019-11-08 PROCEDURE — 94010 BREATHING CAPACITY TEST: CPT

## 2019-11-08 PROCEDURE — 95012 NITRIC OXIDE EXP GAS DETER: CPT

## 2019-11-08 NOTE — PROCEDURE
[FreeTextEntry1] : FENO was 21; a normal value being less than 25\par Fractional exhaled nitric oxide (FENO) is regarded as a simple, noninvasive method for assessing eosinophilic airway inflammation. Produced by a variety of cells within the lung, nitric oxide (NO) concentrations are generally low in healthy individuals. However, high concentrations of NO appear to be involved in nonspecific host defense mechanisms and chronic inflammatory diseases such as asthma. The American Thoracic Society (ATS) therefore has recommended using FENO to aid in the diagnosis and monitoring of eosinophilic airway inflammation and asthma, and for identifying steroid responsive individuals whose chronic respiratory symptoms may be caused by airway inflammation. \par \par PFT revealed normal flows, with a FEV1 of 2.22L, which is 91% of predicted, with a normal flow volume loop \par \par

## 2019-11-08 NOTE — PHYSICAL EXAM
[General Appearance - Well Developed] : well developed [Normal Appearance] : normal appearance [Well Groomed] : well groomed [General Appearance - Well Nourished] : well nourished [No Deformities] : no deformities [General Appearance - In No Acute Distress] : no acute distress [Normal Conjunctiva] : the conjunctiva exhibited no abnormalities [Eyelids - No Xanthelasma] : the eyelids demonstrated no xanthelasmas [Normal Oropharynx] : normal oropharynx [II] : II [Neck Appearance] : the appearance of the neck was normal [Neck Cervical Mass (___cm)] : no neck mass was observed [Jugular Venous Distention Increased] : there was no jugular-venous distention [Thyroid Diffuse Enlargement] : the thyroid was not enlarged [Thyroid Nodule] : there were no palpable thyroid nodules [Heart Rate And Rhythm] : heart rate and rhythm were normal [Heart Sounds] : normal S1 and S2 [Murmurs] : no murmurs present [Respiration, Rhythm And Depth] : normal respiratory rhythm and effort [Exaggerated Use Of Accessory Muscles For Inspiration] : no accessory muscle use [Auscultation Breath Sounds / Voice Sounds] : lungs were clear to auscultation bilaterally [Abdomen Soft] : soft [Abdomen Tenderness] : non-tender [Abdomen Mass (___ Cm)] : no abdominal mass palpated [Abnormal Walk] : normal gait [Gait - Sufficient For Exercise Testing] : the gait was sufficient for exercise testing [Nail Clubbing] : no clubbing of the fingernails [Cyanosis, Localized] : no localized cyanosis [Petechial Hemorrhages (___cm)] : no petechial hemorrhages [Skin Color & Pigmentation] : normal skin color and pigmentation [] : no rash [No Skin Ulcers] : no skin ulcer [No Venous Stasis] : no venous stasis [Skin Lesions] : no skin lesions [No Xanthoma] : no  xanthoma was observed [Sensation] : the sensory exam was normal to light touch and pinprick [Deep Tendon Reflexes (DTR)] : deep tendon reflexes were 2+ and symmetric [Oriented To Time, Place, And Person] : oriented to person, place, and time [Impaired Insight] : insight and judgment were intact [No Focal Deficits] : no focal deficits [Affect] : the affect was normal [FreeTextEntry1] : I:E ratio 1:3; clear

## 2019-11-08 NOTE — HISTORY OF PRESENT ILLNESS
[FreeTextEntry1] : Ms. Smith is a 63 year old female with a history of allergic rhinitis, asthma, GERD, obesity, low vitamin D, KRISTA, eosinophilic asthma, and cough presenting to the office today for a follow up visit. Her chief complaint is her weight. \par - She is doing well\par - she is sleeping well (7-8)\par - It is hard for her to exercise and lose weight \par - Her back is stable (pain)\par - No ankle swelling\par - Bowels are regular\par - No hoarseness\par - She has a floater in her right eye that bothers her occasionally\par - Saw eye doctor \par - She wants to lose weight but is unsure how\par - denies any headaches, nausea, vomiting, fever, chills, sweats, chest pain, chest pressure, diarrhea, constipation, dysphagia, dizziness, leg swelling, leg pain, itchy eyes, itchy ears, heartburn, reflux, or sour taste in the mouth.\par \par \par

## 2019-11-08 NOTE — ASSESSMENT
[FreeTextEntry1] : Ms. Smith has allergic (IgE mediated) and eosinophilic asthma, allergies, OW, urticaria, GERD, and OSAS.  She is currently stable from a pulmonary perspective except for weight management.  \par \par problem 1: moderate persistent asthma \par -continue Levalbuterol via Hand held nebulizer, Q6H\par -continue Advair  at 2 inhalations BID\par -continue to use Spiriva 1 inhalation QD\par -continue Qvar Redihaler 80 2 puffs BID \par -continue to use Singulair 10 mg before bed \par -rescue inhaler PRN \par \par Asthma is believed to be caused by inherited (genetic) and environmental factor, but its exact cause is unknown. Asthma may be triggered by allergens, lung infections, or irritants in the air. Asthma triggers are different for each person \par -Inhaler technique reviewed as well as oral hygiene techniques reviewed with patient. Avoidance of cold air, extremes of temperature, rescue inhaler should be used before exercise. Order of medication reviewed with patient. Recommended use of a cool mist humidifier in the bedroom. \par \par problem 2: eosinophilic asthma (stable)\par -Candidate for Nucala / Fasenra\par -The safety and efficacy of Nucala was established in three double-blind, randomized, placebo-controlled trials in patients with severe asthma. Compared to a placebo, patients with severe asthma receiving Nucala had fewer exacerbation requiring hospitalization and/or emergency department visits, and a longer time to first exacerbation. In addition, patients with severe asthma receiving Nucala or Fasenra experienced greater reductions in their daily maintenance oral corticosteroid dose, while maintaining asthma control compared with patients receiving placebo. Treatment with Nucala did not result in a significant improvement in lung function, as measured by the volume of air exhaled by patients in one second. The most common side effects include: headache, injection site reactions, back pain, weakness, and fatigue; hypersensitivity reactions can occur within hours or days including swelling of the face, mouth, and tongue, fainting, dizziness, hives, breathing problems, and rash; herpes zoster infections have occurred. The drug is a monoclonal antibody that inhibits interleukin -5 which helps regular eosinophils, a type of white blood cell that contributes to asthma. The over-production of eosinophils can cause inflammation in the lungs, increasing the frequency of asthma attacks. Patients must also take other medications, including high dose inhaled corticosteroids and at least one additional asthma drug\par \par problem 3: allergic (IgE mediated) asthma\par -continue to use Xolair 225 q monthly\par -Xolair is a recombinant DNA- derived humanized IgG1K monoclonal antibody that selectively binds ot human immunoglobulin E (IgE). Xolair is produced by a Chinese hamster ovary cell suspension culture in nutrient medium containing the antibiotic gentamicin. Gentamicin is not detectable in the final product. Xolair is a sterile, white, preservative free, lyophilized powder contained in a single use vial that is reconstituted with sterile water for suspension. Side effects include: wheezing, tightness of the chest, trouble breathing, hives, skin rash, feeling anxious or light-headed, fainting, warmth or tingling under skin, or swelling of face, lips, or tongue \par \par problem 4: GERD\par - Continue Zantac 300 mg QHS\par -continue generic Nexium 40 mg before breakfast\par -Rule of 2s: avoid eating too much, eating too late, eating too spicy, eating two hours before bed\par -Things to avoid including overeating, spicy foods, tight clothing, eating within three hours of bed, this list is not all inclusive. \par -For treatment of reflux, possible options discussed including diet control, H2 blockers, PPIs, as well as coating motility agents discussed as treatment options. Timing of meals and proximity of last meal to sleep were discussed. If symptoms persist, a formal gastrointestinal evaluation is needed. \par \par problem 5: allergic rhinitis\par -Olopatadine 0.6% 1 sniff each nostril BID \par -continue to use Veramyst 1 sniff each nostril BID\par -continue Xyzal 5 mg before bed \par \par -Environmental measures for allergies were encouraged including mattress and pillow cover, air purifier, and environmental controls. \par \par problem 6: KRISTA\par -continue to use CPAP machine - tolerating it well and seeing benefits\par -recommended to use Insomnitol prn\par \par -Sleep apnea is associated with adverse clinical consequences which an affect most organ systems. Cardiovascular disease risk includes arrhythmias, atrial fibrillation, hypertension, coronary artery disease, and stroke. Metabolic disorders include diabetes type 2, non-alcoholic fatty liver disease. Mood disorder especially depression; and cognitive decline especially in the elderly. Associations with chronic reflux/Dennis’s esophagus some but not all inclusive. \par -Reasons to assess this include arousal consistent with hypopnea; respiratory events most prominent in REM sleep or supine position; therefore sleep staging and body position are important for accurate diagnosis and estimation of AHI. \par \par problem 7: leg pain (improved)\par -recommended to continue f/u with Dr. Cheyanne Yousif\par -recommended continue to use L-carnitine\par -being added CoQ-10 at 200 mg\par -being added vitamin D 50,000 units once a month\par \par problem 8: obesity\par - Recommend she see Pinky Willis \par -Weight loss, exercise, and diet control were discussed and are highly encouraged. Treatment options were given such as, aqua therapy, and contacting a nutritionist. Recommended to use the elliptical, stationary bike, less use of treadmill. Obesity is associated with worsening asthma, shortness of breath, and potential for cardiac disease, diabetes, and other underlying medical conditions. \par -recommended to consider yoga\par \par problem 9: idiopathic urticaria\par - It is recommended that she hold all of her supplements and then restart them one at a time. \par -continue to use Xolair 225 q monthly \par \par problem 10: fatigue\par -recommended to go gluten free\par -recommended to look at number of hours she is sleeping as well as her CPAP pressures \par \par problem 11: health maintenance \par -s/p flu shot - 2019\par -recommended strep pneumonia vaccines: Prevnar-13 vaccine, followed by Pneumo vaccine 23 one year following\par -recommended early intervention for URIs\par -recommended regular osteoporosis evaluations\par -recommended early dermatological evaluations\par -recommended after the age of 50 to the age of 70, colonoscopy every 5 years \par  \par \par F/U in 4 months - SPI / NIOX\par She is encouraged to call with any changes, concerns, or questions.

## 2019-11-08 NOTE — ADDENDUM
[FreeTextEntry1] : Documented by Edwina Kumar acting as a scribe for Dr. Korey Bautista on (11/08/2019).\par \par All medical record entries made by the Scribe were at my, Dr. Korey Bautista's, direction and personally dictated by me on (11/08/2019). I have reviewed the chart and agree that the record accurately reflects my personal performance of the history, physical exam, assessment and plan. I have also personally directed, reviewed, and agree with the discharge instructions. \par \par \par

## 2019-12-30 ENCOUNTER — RX RENEWAL (OUTPATIENT)
Age: 64
End: 2019-12-30

## 2020-01-21 ENCOUNTER — RX RENEWAL (OUTPATIENT)
Age: 65
End: 2020-01-21

## 2020-02-07 ENCOUNTER — APPOINTMENT (OUTPATIENT)
Dept: PULMONOLOGY | Facility: CLINIC | Age: 65
End: 2020-02-07
Payer: MEDICARE

## 2020-02-07 ENCOUNTER — NON-APPOINTMENT (OUTPATIENT)
Age: 65
End: 2020-02-07

## 2020-02-07 VITALS
HEIGHT: 63 IN | HEART RATE: 82 BPM | BODY MASS INDEX: 34.55 KG/M2 | OXYGEN SATURATION: 96 % | RESPIRATION RATE: 17 BRPM | WEIGHT: 195 LBS | SYSTOLIC BLOOD PRESSURE: 120 MMHG | TEMPERATURE: 99.1 F | DIASTOLIC BLOOD PRESSURE: 80 MMHG

## 2020-02-07 DIAGNOSIS — R05 COUGH: ICD-10-CM

## 2020-02-07 PROCEDURE — 99214 OFFICE O/P EST MOD 30 MIN: CPT | Mod: 25

## 2020-02-07 PROCEDURE — 71046 X-RAY EXAM CHEST 2 VIEWS: CPT

## 2020-02-07 PROCEDURE — 94010 BREATHING CAPACITY TEST: CPT

## 2020-02-07 RX ORDER — TIOTROPIUM BROMIDE 18 UG/1
18 CAPSULE ORAL; RESPIRATORY (INHALATION)
Qty: 3 | Refills: 1 | Status: DISCONTINUED | COMMUNITY
Start: 2019-07-11 | End: 2020-02-07

## 2020-02-07 RX ORDER — RANITIDINE 300 MG/1
300 TABLET ORAL
Qty: 30 | Refills: 1 | Status: DISCONTINUED | COMMUNITY
Start: 2018-10-22 | End: 2020-02-07

## 2020-02-07 RX ORDER — ALBUTEROL SULFATE 2.5 MG/3ML
(2.5 MG/3ML) SOLUTION RESPIRATORY (INHALATION) 4 TIMES DAILY
Qty: 1 | Refills: 1 | Status: DISCONTINUED | COMMUNITY
Start: 2018-10-22 | End: 2020-02-07

## 2020-02-07 RX ORDER — RANITIDINE 300 MG/1
300 TABLET ORAL
Qty: 90 | Refills: 1 | Status: DISCONTINUED | COMMUNITY
Start: 2018-07-13 | End: 2020-02-07

## 2020-02-07 RX ORDER — BECLOMETHASONE DIPROPIONATE HFA 80 UG/1
80 AEROSOL, METERED RESPIRATORY (INHALATION) TWICE DAILY
Qty: 31.8 | Refills: 1 | Status: DISCONTINUED | COMMUNITY
Start: 2018-10-22 | End: 2020-02-07

## 2020-02-08 ENCOUNTER — TRANSCRIPTION ENCOUNTER (OUTPATIENT)
Age: 65
End: 2020-02-08

## 2020-02-08 PROBLEM — R05 COUGH: Status: ACTIVE | Noted: 2018-03-07

## 2020-02-08 NOTE — PHYSICAL EXAM
[Normal Oropharynx] : normal oropharynx [No Acute Distress] : no acute distress [Normal Rate/Rhythm] : normal rate/rhythm [No Neck Mass] : no neck mass [Normal Appearance] : normal appearance [No Resp Distress] : no resp distress [No Murmurs] : no murmurs [Normal S1, S2] : normal s1, s2 [No Abnormalities] : no abnormalities [Normal Gait] : normal gait [No Clubbing] : no clubbing [No Cyanosis] : no cyanosis [No Edema] : no edema [FROM] : FROM [Normal Color/ Pigmentation] : normal color/ pigmentation [Oriented x3] : oriented x3 [Normal Affect] : normal affect [TextBox_68] : Inspiratory and Expiratory wheezes auscultated.

## 2020-02-08 NOTE — HISTORY OF PRESENT ILLNESS
[TextBox_4] : Ms. Smith is a 64 year old female with a history of allergic rhinitis, asthma, GERD, obesity, low vitamin D, KRISTA, eosinophilic asthma, and cough presenting to the office today for an acute visit.\par \par Her chief complaint is cough x 3 days.\par Pt. c/o fever, highest noted was 101.  She notes she has also felt fatigue, chest tightness, and SOB @ rest and exertion. She states her lower back is aching, but she attributes this symptom because she has been coughing frequently and hard. \par \par She denies sputum production, sore throat, post-nasal drip, reflux symptoms, palpitations, dizziness, wheezing or N/V/D.

## 2020-02-08 NOTE — ASSESSMENT
[FreeTextEntry1] : The plan for the patient is as follows:\par \par 1. Acute Asthmatic Bronchitis:\par - Add Prednisone: Take 30mg x 5 days, 20 mg x 5 days, and 10mg x 5 days.\par - Add Biaxin 500 mg; 1 tablet BID x 10 days.\par - Add Levalbuterol via nebulizer TID-QID.\par - Add Budesonide via nebulizer BID - rinse and gargle post use. \par - d/c QVar inhaler\par - Continue Advair 230; 2 puffs BID - rinse and gargle post use.\par - Add Spiriva 1 inhalation, daily. \par \par 2. Cough:\par - CXR done in office.\par - Add Benzonatate 200 PO TID PRN.\par \par 3. Asthma - severe persistent:\par - Continue Xolair 225 Q Monthly.\par \par 4. KRISTA on CPAP:\par - Continued CPAP therapy; tolerating well. \par \par 5. GERD, stable:\par - Continue Omeprazole 40 mg PO QD before breakfast.\par - Continue Famotidine 40 mg PO QHS. \par \par Discussed main concern is controlling asthma exacerbation -- discussed possibility of flu and if symptoms worsen over the weekend she is encouraged to get swabbed at an urgent care.\par \par Pt. verbalizes understanding.

## 2020-02-08 NOTE — REVIEW OF SYSTEMS
[Fever] : fever [Fatigue] : fatigue [Cough] : cough [Chest Tightness] : chest tightness [Dyspnea] : dyspnea [Myalgias] : myalgias [SOB on Exertion] : sob on exertion [Negative] : Endocrine [Chills] : no chills [Postnasal Drip] : no postnasal drip [Nasal Congestion] : no nasal congestion [Sore Throat] : no sore throat [Sputum] : no sputum [Wheezing] : no wheezing [Sinus Problems] : no sinus problems [TextBox_94] : Back aches

## 2020-02-08 NOTE — PROCEDURE
[FreeTextEntry1] : SPI: FEV1 - 71%; abnormal flow volume loop.\par \par CXR in office; Read by Dr. Bautista. \par Impression: Normal appearing heart. Calcified Cartilage. No infiltrates, effusion or opacities noted.\par \par

## 2020-02-10 ENCOUNTER — TRANSCRIPTION ENCOUNTER (OUTPATIENT)
Age: 65
End: 2020-02-10

## 2020-02-10 RX ORDER — BUDESONIDE 0.5 MG/2ML
0.5 INHALANT ORAL TWICE DAILY
Qty: 1 | Refills: 1 | Status: ACTIVE | COMMUNITY
Start: 2020-02-07 | End: 1900-01-01

## 2020-02-17 ENCOUNTER — RX RENEWAL (OUTPATIENT)
Age: 65
End: 2020-02-17

## 2020-02-17 RX ORDER — EPINEPHRINE 0.3 MG/.3ML
0.3 INJECTION INTRAMUSCULAR
Qty: 2 | Refills: 4 | Status: ACTIVE | COMMUNITY
Start: 2020-02-17 | End: 1900-01-01

## 2020-02-24 ENCOUNTER — APPOINTMENT (OUTPATIENT)
Dept: SURGERY | Facility: CLINIC | Age: 65
End: 2020-02-24
Payer: MEDICARE

## 2020-02-24 PROCEDURE — 99213K: CUSTOM

## 2020-03-25 ENCOUNTER — RX RENEWAL (OUTPATIENT)
Age: 65
End: 2020-03-25

## 2020-05-04 ENCOUNTER — TRANSCRIPTION ENCOUNTER (OUTPATIENT)
Age: 65
End: 2020-05-04

## 2020-05-05 ENCOUNTER — TRANSCRIPTION ENCOUNTER (OUTPATIENT)
Age: 65
End: 2020-05-05

## 2020-07-02 ENCOUNTER — RX RENEWAL (OUTPATIENT)
Age: 65
End: 2020-07-02

## 2020-10-02 ENCOUNTER — APPOINTMENT (OUTPATIENT)
Dept: PULMONOLOGY | Facility: CLINIC | Age: 65
End: 2020-10-02
Payer: MEDICARE

## 2020-10-02 VITALS
HEART RATE: 74 BPM | WEIGHT: 192 LBS | RESPIRATION RATE: 16 BRPM | TEMPERATURE: 97 F | OXYGEN SATURATION: 96 % | HEIGHT: 63.6 IN | BODY MASS INDEX: 33.18 KG/M2 | DIASTOLIC BLOOD PRESSURE: 70 MMHG | SYSTOLIC BLOOD PRESSURE: 120 MMHG

## 2020-10-02 DIAGNOSIS — M79.7 FIBROMYALGIA: ICD-10-CM

## 2020-10-02 PROCEDURE — 95012 NITRIC OXIDE EXP GAS DETER: CPT

## 2020-10-02 PROCEDURE — 99214 OFFICE O/P EST MOD 30 MIN: CPT | Mod: 25

## 2020-10-02 PROCEDURE — 94618 PULMONARY STRESS TESTING: CPT

## 2020-10-02 NOTE — HISTORY OF PRESENT ILLNESS
[FreeTextEntry1] : Ms. Smith is a 64 year old female with a history of allergic rhinitis, asthma, GERD, obesity, low vitamin D, KRISTA, eosinophilic asthma, and cough presenting to the office today for a follow up visit. Her chief complaint is rash. \par -she states that her energy level is good. \par -she was not walking during the summer but is now starting to walk. \par -has been getting enough sleep. Gets 8 hours of sleep a night. has uninterrupted sleep. \par -one day last week she felt like her chest was closing and she used the nebulizer. It is now resolved. \par -denies coughing or wheezing. \par -sinuses are good and clear. \par -denies palpitations. \par -has been eating well. \par -Appetite is good. \par -There are no visual issues.\par -Sense of Smell is good.\par -Sense of Taste is good. \par -reports rashes are always occur. \par -occasional muscle spasms. \par -she is on Xolair for her rashes but the doctor giving her is weaning it off. \par -she reports that she being on the Xolair her breathing gets better. \par -She reports that She is not using any new medication, vitamins, or supplements. \par -has not been using Singulair and Spiriva. \par -she thinks that Xolair made her hair thin. \par -has been using the CPAP, tolerating it well, and has been benefiting from its use. \par -she wishes to lose some weight. she is unable to lose weight. \par \par -She denies any chest pain, chest pressure, diarrhea, constipation, dysphagia, dizziness, sour taste in the mouth, leg swelling, leg pain, itchy eyes, itchy ears, heartburn, reflux.

## 2020-10-02 NOTE — PROCEDURE
[FreeTextEntry1] : 6 minute walk test reveals a low saturation of 93% with no evidence of dyspnea or fatigue; walked 521.6 meters.\par \par FENO was 36; a normal value being less than 25\par Fractional exhaled nitric oxide (FENO) is regarded as a simple, noninvasive method for assessing eosinophilic airway inflammation. Produced by a variety of cells within the lung, nitric oxide (NO) concentrations are generally low in healthy individuals. However, high concentrations of NO appear to be involved in nonspecific host defense mechanisms and chronic inflammatory diseases such as asthma. The American Thor[employer] Society (ATS) therefore has recommended using FENO to aid in the diagnosis and monitoring of eosinophilic airway inflammation and asthma, and for identifying steroid responsive individuals whose chronic respiratory symptoms may be caused by airway inflammation.

## 2020-10-02 NOTE — ASSESSMENT
[FreeTextEntry1] : Ms. Smith is 64 year old Female who has a history of allergic (IgE mediated) and eosinophilic asthma, allergies, OW, urticaria, GERD, and OSAS.  She is currently stable from a pulmonary perspective except for weight management.  \par \par problem 1: moderate persistent asthma \par -continue Levalbuterol via Hand held nebulizer, Q6H\par -continue Advair  at 2 inhalations BID\par -continue to use Spiriva 1 inhalation QD\par -continue Qvar Redihaler 80 2 puffs BID \par -continue to use Singulair 10 mg before bed \par -rescue inhaler PRN \par \par Asthma is believed to be caused by inherited (genetic) and environmental factor, but its exact cause is unknown. Asthma may be triggered by allergens, lung infections, or irritants in the air. Asthma triggers are different for each person \par -Inhaler technique reviewed as well as oral hygiene techniques reviewed with patient. Avoidance of cold air, extremes of temperature, rescue inhaler should be used before exercise. Order of medication reviewed with patient. Recommended use of a cool mist humidifier in the bedroom. \par \par problem 2: eosinophilic asthma (stable)\par -Candidate for Nucala / Fasenra\par -The safety and efficacy of Nucala was established in three double-blind, randomized, placebo-controlled trials in patients with severe asthma. Compared to a placebo, patients with severe asthma receiving Nucala had fewer exacerbation requiring hospitalization and/or emergency department visits, and a longer time to first exacerbation. In addition, patients with severe asthma receiving Nucala or Fasenra experienced greater reductions in their daily maintenance oral corticosteroid dose, while maintaining asthma control compared with patients receiving placebo. Treatment with Nucala did not result in a significant improvement in lung function, as measured by the volume of air exhaled by patients in one second. The most common side effects include: headache, injection site reactions, back pain, weakness, and fatigue; hypersensitivity reactions can occur within hours or days including swelling of the face, mouth, and tongue, fainting, dizziness, hives, breathing problems, and rash; herpes zoster infections have occurred. The drug is a monoclonal antibody that inhibits interleukin -5 which helps regular eosinophils, a type of white blood cell that contributes to asthma. The over-production of eosinophils can cause inflammation in the lungs, increasing the frequency of asthma attacks. Patients must also take other medications, including high dose inhaled corticosteroids and at least one additional asthma drug\par \par problem 3: allergic (IgE mediated) asthma\par -continue to use Xolair 225 q monthly - attempting weaning. \par -Xolair is a recombinant DNA- derived humanized IgG1K monoclonal antibody that selectively binds ot human immunoglobulin E (IgE). Xolair is produced by a Chinese hamster ovary cell suspension culture in nutrient medium containing the antibiotic gentamicin. Gentamicin is not detectable in the final product. Xolair is a sterile, white, preservative free, lyophilized powder contained in a single use vial that is reconstituted with sterile water for suspension. Side effects include: wheezing, tightness of the chest, trouble breathing, hives, skin rash, feeling anxious or light-headed, fainting, warmth or tingling under skin, or swelling of face, lips, or tongue \par \par problem 4: GERD\par -Add Pepcid 40mg QHS \par -continue generic Nexium 40 mg before breakfast\par -Rule of 2s: avoid eating too much, eating too late, eating too spicy, eating two hours before bed\par -Things to avoid including overeating, spicy foods, tight clothing, eating within three hours of bed, this list is not all inclusive. \par -For treatment of reflux, possible options discussed including diet control, H2 blockers, PPIs, as well as coating motility agents discussed as treatment options. Timing of meals and proximity of last meal to sleep were discussed. If symptoms persist, a formal gastrointestinal evaluation is needed. \par \par problem 5: allergic rhinitis\par -Olopatadine 0.6% 1 sniff each nostril BID \par -continue to use Veramyst 1 sniff each nostril BID\par -continue Xyzal 5 mg before bed \par \par -Environmental measures for allergies were encouraged including mattress and pillow cover, air purifier, and environmental controls. \par \par problem 6: KRISTA\par -continue to use CPAP machine - tolerating it well and seeing benefits\par -recommended to use Insomnitol prn\par \par -Sleep apnea is associated with adverse clinical consequences which an affect most organ systems. Cardiovascular disease risk includes arrhythmias, atrial fibrillation, hypertension, coronary artery disease, and stroke. Metabolic disorders include diabetes type 2, non-alcoholic fatty liver disease. Mood disorder especially depression; and cognitive decline especially in the elderly. Associations with chronic reflux/Dennis’s esophagus some but not all inclusive. \par -Reasons to assess this include arousal consistent with hypopnea; respiratory events most prominent in REM sleep or supine position; therefore sleep staging and body position are important for accurate diagnosis and estimation of AHI. \par \par problem 7: leg pain (improved)\par -recommended to continue f/u with Dr. Cheyanne Yousif\par -recommended continue to use L-carnitine\par -being added CoQ-10 at 200 mg\par -being added vitamin D 50,000 units once a month\par \par problem 8: obesity\par -Recommend she see Dr. Paniagua/ [facility] \par -Weight loss, exercise, and diet control were discussed and are highly encouraged. Treatment options were given such as, aqua therapy, and contacting a nutritionist. Recommended to use the elliptical, stationary bike, less use of treadmill. Obesity is associated with worsening asthma, shortness of breath, and potential for cardiac disease, diabetes, and other underlying medical conditions. \par -recommended to consider yoga\par \par problem 9: idiopathic urticaria\par - It is recommended that she hold all of her supplements and then restart them one at a time. \par -continue to use Xolair 225 q monthly - attempting taper to off. \par \par problem 10: fatigue\par -recommended to go gluten free\par -recommended to look at number of hours she is sleeping as well as her CPAP pressures \par \par problem 11: health maintenance \par -s/p flu shot - 2019\par -recommended strep pneumonia vaccines: Prevnar-13 vaccine, followed by Pneumo vaccine 23 one year following\par -recommended early intervention for URIs\par -recommended regular osteoporosis evaluations\par -recommended early dermatological evaluations\par -recommended after the age of 50 to the age of 70, colonoscopy every 5 years \par  \par \par F/U in 4 months - SPI / NIOX\par She is encouraged to call with any changes, concerns, or questions.

## 2020-10-02 NOTE — ADDENDUM
[FreeTextEntry1] : Documented by Amador Abreu acting as a scribe for Dr. Korey Bautista on 10/02/2020 \par \par All medical record entries made by the Scribe were at my, Dr. Korey Bautista's, direction and personally dictated by me on 10/02/2020 . I have reviewed the chart and agree that the record accurately reflects my personal performance of the history, physical exam, assessment and plan. I have also personally directed, reviewed, and agree with the discharge instructions.

## 2020-10-08 ENCOUNTER — RESULT REVIEW (OUTPATIENT)
Age: 65
End: 2020-10-08

## 2020-10-08 ENCOUNTER — OUTPATIENT (OUTPATIENT)
Dept: OUTPATIENT SERVICES | Facility: HOSPITAL | Age: 65
LOS: 1 days | End: 2020-10-08
Payer: MEDICARE

## 2020-10-08 ENCOUNTER — APPOINTMENT (OUTPATIENT)
Dept: ULTRASOUND IMAGING | Facility: CLINIC | Age: 65
End: 2020-10-08
Payer: MEDICARE

## 2020-10-08 ENCOUNTER — APPOINTMENT (OUTPATIENT)
Dept: MAMMOGRAPHY | Facility: CLINIC | Age: 65
End: 2020-10-08
Payer: MEDICARE

## 2020-10-08 DIAGNOSIS — Z00.8 ENCOUNTER FOR OTHER GENERAL EXAMINATION: ICD-10-CM

## 2020-10-08 PROCEDURE — 77067 SCR MAMMO BI INCL CAD: CPT | Mod: 26

## 2020-10-08 PROCEDURE — 77067 SCR MAMMO BI INCL CAD: CPT

## 2020-10-08 PROCEDURE — 76641 ULTRASOUND BREAST COMPLETE: CPT | Mod: 26,50

## 2020-10-08 PROCEDURE — 77063 BREAST TOMOSYNTHESIS BI: CPT | Mod: 26

## 2020-10-08 PROCEDURE — 77063 BREAST TOMOSYNTHESIS BI: CPT

## 2020-10-08 PROCEDURE — 76641 ULTRASOUND BREAST COMPLETE: CPT

## 2020-11-11 ENCOUNTER — APPOINTMENT (OUTPATIENT)
Dept: BARIATRICS/WEIGHT MGMT | Facility: CLINIC | Age: 65
End: 2020-11-11
Payer: MEDICARE

## 2020-11-11 VITALS — HEIGHT: 64 IN | BODY MASS INDEX: 33.63 KG/M2 | WEIGHT: 197 LBS

## 2020-11-11 DIAGNOSIS — G89.29 OTHER CHRONIC PAIN: ICD-10-CM

## 2020-11-11 PROCEDURE — 99205 OFFICE O/P NEW HI 60 MIN: CPT | Mod: 95

## 2020-11-11 RX ORDER — NYSTATIN 100000 [USP'U]/ML
100000 SUSPENSION ORAL
Qty: 1 | Refills: 2 | Status: DISCONTINUED | COMMUNITY
Start: 2019-07-11 | End: 2020-11-11

## 2020-11-11 RX ORDER — FLUTICASONE PROPIONATE 0.5 MG/G
0.05 CREAM TOPICAL
Qty: 60 | Refills: 0 | Status: DISCONTINUED | COMMUNITY
Start: 2017-02-23 | End: 2020-11-11

## 2020-11-11 RX ORDER — CLARITHROMYCIN 500 MG/1
500 TABLET, FILM COATED ORAL
Qty: 20 | Refills: 0 | Status: DISCONTINUED | COMMUNITY
Start: 2020-02-07 | End: 2020-11-11

## 2020-11-11 RX ORDER — MOMETASONE FUROATE 1 MG/G
0.1 OINTMENT TOPICAL
Qty: 45 | Refills: 0 | Status: DISCONTINUED | COMMUNITY
Start: 2018-03-22 | End: 2020-11-11

## 2020-11-11 RX ORDER — BENZONATATE 200 MG/1
200 CAPSULE ORAL
Qty: 21 | Refills: 0 | Status: DISCONTINUED | COMMUNITY
Start: 2018-10-18 | End: 2020-11-11

## 2020-11-11 RX ORDER — CLINDAMYCIN PHOSPHATE 20 MG/G
2 CREAM VAGINAL
Qty: 40 | Refills: 0 | Status: DISCONTINUED | COMMUNITY
Start: 2017-05-25 | End: 2020-11-11

## 2020-11-11 RX ORDER — CLOBETASOL PROPIONATE 0.5 MG/G
0.05 OINTMENT TOPICAL
Qty: 60 | Refills: 0 | Status: DISCONTINUED | COMMUNITY
Start: 2017-03-27 | End: 2020-11-11

## 2020-11-11 RX ORDER — OLOPATADINE HYDROCHLORIDE 665 UG/1
0.6 SPRAY, METERED NASAL
Qty: 30.5 | Refills: 5 | Status: DISCONTINUED | COMMUNITY
Start: 2018-03-16 | End: 2020-11-11

## 2020-11-11 RX ORDER — PIMECROLIMUS 10 MG/G
1 CREAM TOPICAL
Qty: 60 | Refills: 0 | Status: DISCONTINUED | COMMUNITY
Start: 2018-06-27 | End: 2020-11-11

## 2020-11-11 RX ORDER — FLUTICASONE PROPIONATE AND SALMETEROL XINAFOATE 230; 21 UG/1; UG/1
230-21 AEROSOL, METERED RESPIRATORY (INHALATION) TWICE DAILY
Qty: 3 | Refills: 1 | Status: DISCONTINUED | COMMUNITY
Start: 2019-07-11 | End: 2020-11-11

## 2020-11-11 RX ORDER — CELECOXIB 200 MG/1
200 CAPSULE ORAL
Qty: 30 | Refills: 0 | Status: DISCONTINUED | COMMUNITY
Start: 2017-03-20 | End: 2020-11-11

## 2020-11-11 RX ORDER — BENZONATATE 200 MG/1
200 CAPSULE ORAL 3 TIMES DAILY
Qty: 90 | Refills: 1 | Status: DISCONTINUED | COMMUNITY
Start: 2020-02-07 | End: 2020-11-11

## 2020-11-11 RX ORDER — CALCIPOTRIENE 50 UG/G
0.01 CREAM TOPICAL
Qty: 60 | Refills: 0 | Status: DISCONTINUED | COMMUNITY
Start: 2017-03-08 | End: 2020-11-11

## 2020-11-11 RX ORDER — HYDROCORTISONE PROBUTATE 1 MG/G
0.1 CREAM TOPICAL
Qty: 80 | Refills: 0 | Status: DISCONTINUED | COMMUNITY
Start: 2017-03-10 | End: 2020-11-11

## 2020-11-11 RX ORDER — CLOBETASOL PROPIONATE 0.5 MG/G
0.05 CREAM TOPICAL
Qty: 60 | Refills: 0 | Status: DISCONTINUED | COMMUNITY
Start: 2017-06-07 | End: 2020-11-11

## 2020-11-11 RX ORDER — HYDROCORTISONE VALERATE 2 MG/G
0.2 CREAM TOPICAL
Qty: 60 | Refills: 0 | Status: DISCONTINUED | COMMUNITY
Start: 2017-03-27 | End: 2020-11-11

## 2020-11-11 RX ORDER — AZELASTINE HYDROCHLORIDE AND FLUTICASONE PROPIONATE 137; 50 UG/1; UG/1
137-50 SPRAY, METERED NASAL
Qty: 1 | Refills: 1 | Status: DISCONTINUED | COMMUNITY
Start: 2017-02-08 | End: 2020-11-11

## 2020-11-11 RX ORDER — HYDROCODONE BITARTRATE AND HOMATROPINE METHYLBROMIDE 5; 1.5 MG/5ML; MG/5ML
5-1.5 SYRUP ORAL
Qty: 240 | Refills: 0 | Status: DISCONTINUED | COMMUNITY
Start: 2018-10-22 | End: 2020-11-11

## 2020-11-11 RX ORDER — FENOFIBRIC ACID 135 MG/1
135 CAPSULE, DELAYED RELEASE ORAL
Refills: 0 | Status: ACTIVE | COMMUNITY
Start: 2020-11-11

## 2020-11-11 RX ORDER — SODIUM SULFATE, POTASSIUM SULFATE, MAGNESIUM SULFATE 17.5; 3.13; 1.6 G/ML; G/ML; G/ML
17.5-3.13-1.6 SOLUTION, CONCENTRATE ORAL
Qty: 354 | Refills: 0 | Status: DISCONTINUED | COMMUNITY
Start: 2017-07-07 | End: 2020-11-11

## 2020-11-11 NOTE — HISTORY OF PRESENT ILLNESS
[FreeTextEntry1] : Ms. PEDRO BURDEN is a 65 year year old female who presents for evaluation and treatment of Class 1 obesity. \par \par Obesity related co-morbidities: Asthma - doesn't limit her - but might to go on prednisone 2-3 times a year, gerd on pepcid and nexium, na with cpap - severe,  hypothyroidism - has Hashimoto's, scoliosis, and fibromyalgia.  \par \par Patient lives -  and 2 daughters - 2nd year college, one in 9th grade. \par Employment status - retired, disabled.\par \par Diabetes - endocrinologist prescribed Trulicity, and gave sample pack.  2 weeks now on 0.75mg weekly.  \par Scoliosis - She does PT for once a week for years. \par Breast cancer - 2 lumpectomies, radiation, hormone treatment for 11 years - tamoxifen - became allergic, and then aromicin, arimidex.  On Lupron as well. Developed severe hot flashes - put on Effexor and neurontin, started in 2007.   \par \par Weight History:\par Lowest adult weight: 105 \par Highest adult weight: 197 (now)\par \par Has gained/lost 5-10 pounds over the past year.  Has been trying the past 4 years to lose weight.\par \par Obesity began in adult - 40s after breast cancer diagnosis.  DId not gain weight in college "ate whatever" .  Weight gain has occurred with: Started with 2007 when she got breast cancer "crashed me into menopause."  Age 52 with cancer diagnosis - was around 140 lbs.  Weight gain has been gradual, "a lb here and a lb there", 10-12 lbs per year. Has been relatively stable 180s-190s since 2016 per records.\par \par Past weight loss attempts include:  intermittent fasting, 14 hr, mostly by eating late breakfast or skipping. Weight Watchers several times These have produced a maximum of 0-5 pounds of weight loss.  \par Anti-obesity medications in the past: Trulicity, metformin. \par \par Reasons for desiring weight loss: health, feeling better in clothes, move better\par Perceived obstacles to losing weight: physical movement limited\par \par Sleep: 7-10 hours. +cpap every night, even when she takes a nap. Developed in 2007. \par \par +gluten free diet\par \par Has 2 regular meals a day. \par Trying intermittent fasting\par Diet history:\par wakes up at: \par B: skips b/c she's trying to do intermittent fasting - 14hours\par L: 1-2pm protein- turkey/sardines, cottage cheese, piece of fruit or dark chocolate, frozen grapes\par start making dinner around 5pm - 6-7pm protein and a vegetable\par 8-9pm snack - fruit for dessert, or frozen treat - coffee pop, or 100 calorie yogurt\par \par snacks: yes evening\par eating after dinner: yes\par overeating episodes: feels bloated sometimes after dinner\par \par Sodas/fast food/processed foods: no eating out. chocolate a few times a week, dark. \par \par Water intake per day: 5 cups per day\par coffee 1 cup twice a week - usually just black with a little truvia, or creamer - vanilla flavored. \par \par Physical activity: \par Patient enjoys: stretching - daily x15 minutes, short bike rides or walks - not often.\par Current physical activity: stretching only - daily.  Standing in kitchen, walking up/down steps doing laundry.  PT once a week. \par \par Habits patient would like to change: unsure\par Level of interest in losing weight: 5/5\par Community support: 1/5 friends 1/5 family\par \par Factors that have helped in the past with losing weight and keeping it off:none\par \par

## 2020-11-11 NOTE — REVIEW OF SYSTEMS
[Patient Intake Form Reviewed] : Patient intake form was reviewed [Negative] : Allergic/Immunologic [MED-ROS-Cons-FT] : weight gain [MED-ROS-Adi-FT] : chronic pain back [MED-ROS-Integum-FT] : rash, psoriasis

## 2020-11-11 NOTE — ASSESSMENT
[FreeTextEntry1] : 65 y.o female with Class 1 obesity, KRISTA on CPAP, prediabetes on metformin and recently Trulicity, h/o breast cancer with radiation in 2007 with subsequent weight gain, scoliosis - weekly PT, chronic pain presents for weight management.\par \par - add breakfast - gluten free rolled oats [employer] [place] [employer] with nuts/fruit/ nondairy milk\par - avoid any snacks after dinner except water\par - discussed better intermittent fasting techniques - early dinner, early breakfast, 12-14 hours in between\par - discussed plate, will email handouts\par - movement meaning stretching/yoga to improve flexibility every day\par - did see phendimetrizine which is a stimulant in her med history, could consider but since she's taking trulicity which is great we will optimize that first.  Could increase to 1.5mg weekly if tolerating well.\par - wants to avoid getting to 200#, first and foremost.  \par - water - 64 oz per day minimum\par \par Extensive dietary counseling was provided:\par -discussed calorie reduction options: plant-based whole food diet vs. Dash / Mediterranean w/ calorie reduction\par -three meal components emphasized: large portion vegetables/fruit, smaller portion protein favoring plant-based, smaller portion high fiber carbohydrates\par -properties of macronutrients were reviewed to help the patient understand why a balanced diet is important\par -discussed the avoidance of red and processed meat, sugar sweetened beverages, refined carbohydrates, high fat dairy\par -advised avoidance of snack products and packaged / processed foods\par -counseled about meal timing and portion: large breakfast, medium lunch, small dinner\par -advised to avoid carbohydrates in evening if possible\par -regular water or seltzer throughout the day\par -for stimulus control, advised to keep unhealthy foods out of the house or out of view\par -recommended abstaining entirely from restaurant / fast food / take-out meals\par \par Lifestyle recommendations for weight loss were extensively reviewed\par -aerobic exercise reviewed: moderate intensity versus high intensity exercise - an estimate of daily / weekly time requirements was reviewed\par -emphasized that resistance training in addition to aerobic may provide added benefit\par -emphasized that long term weight loss and maintenance depend upon exercise\par -the need for adequate sleep (6+ hours) was reviewed\par \par f/u 2 weeks TEB\par \par Bariatric surgery history: none\par Obesity co-morbidities:krista, prediabetes\par Comorbidities improved or resolved:none\par Anti-obesity medications:trulicity, metformin\par Obesity medication side effects:na\par \par

## 2020-11-18 ENCOUNTER — TRANSCRIPTION ENCOUNTER (OUTPATIENT)
Age: 65
End: 2020-11-18

## 2020-11-25 ENCOUNTER — APPOINTMENT (OUTPATIENT)
Dept: BARIATRICS/WEIGHT MGMT | Facility: CLINIC | Age: 65
End: 2020-11-25
Payer: MEDICARE

## 2020-11-25 VITALS — WEIGHT: 194 LBS | BODY MASS INDEX: 33.3 KG/M2

## 2020-11-25 PROCEDURE — 99214 OFFICE O/P EST MOD 30 MIN: CPT | Mod: 95

## 2020-11-25 NOTE — HISTORY OF PRESENT ILLNESS
[FreeTextEntry1] : Ms. PEDRO BURDEN is a 65 year year old female who presents for evaluation and treatment of Class 1 obesity. \par \par Obesity related co-morbidities: Asthma - doesn't limit her - but might to go on prednisone 2-3 times a year, gerd on pepcid and nexium, na with cpap - severe,  hypothyroidism - has Hashimoto's, scoliosis, and fibromyalgia.  \par \par Interim\par - She's been on Trulicity 0.75 for a few weeks, no nausea now. \par - she is not hungry/ no desire to eat breakfast, so she wants to hold off on that for now. \par - she did go through the email - saw some recipes she'd like to try, and like \par - adding some veggies to the protein.\par - gets busy after dinner - does bills, family things\par \par Continues intermittent fasting\par Diet history:\par +gluten free diet\par wakes up at: \par B: skips b/c she's trying to do intermittent fasting - 14hours\par L: 1-2pm protein- turkey/sardines, cottage cheese, piece of fruit or dark chocolate, frozen grapes\par D:  6-7pm protein and a vegetable, sometimes a fruit afterward.  \par 8-9pm snack - fruit for dessert, or frozen treat - coffee pop, or 100 calorie yogurt\par \par snacks: yes evening\par eating after dinner: yes\par Sodas/fast food/processed foods: no eating out. chocolate a few times a week, dark. \par \par Water intake per day: 16 oz x 3 = 48 oz per day.\par coffee 1 cup twice a week - usually just black with a little truvia, or creamer - vanilla flavored. \par \par Sleep: 7-10 hours. +cpap every night, even when she takes a nap. Developed in 2007. \par \par Physical activity: \par - she'd like to go up and down stairs - several times a day for her exercise\par Patient enjoys: stretching - daily x15 minutes, short bike rides or walks - not often.\par Current physical activity: stretching only - daily.  Standing in kitchen, walking up/down steps doing laundry.  PT once a week. \par \par Diabetes - endocrinologist prescribed Trulicity, and gave sample pack.  2 weeks now on 0.75mg weekly.  \par Scoliosis - She does PT for once a week for years. \par Breast cancer - 2 lumpectomies, radiation, hormone treatment for 11 years - tamoxifen - became allergic, and then aromicin, arimidex.  On Lupron as well. Developed severe hot flashes - put on Effexor and neurontin, started in 2007.   \par \par Weight History:\par Lowest adult weight: 105 \par Highest adult weight: 197 (now)\par \par Has gained/lost 5-10 pounds over the past year.  Has been trying the past 4 years to lose weight.\par \par Obesity began in adult - 40s after breast cancer diagnosis.  DId not gain weight in college "ate whatever".  Weight gain has occurred with: Started with 2007 when she got breast cancer "crashed me into menopause."  Age 52 with cancer diagnosis - was around 140 lbs.  Weight gain has been gradual, "a lb here and a lb there", 10-12 lbs per year. Has been relatively stable 180s-190s since 2016 per records.\par \par Past weight loss attempts include:  intermittent fasting, 14 hr, mostly by eating late breakfast or skipping. Weight Watchers several times These have produced a maximum of 0-5 pounds of weight loss.  \par Anti-obesity medications in the past: Trulicity, metformin. \par \par Social history:\par Patient lives -  and 2 daughters - 2nd year college, one in 9th grade. \par Employment status - retired, disabled.

## 2020-11-25 NOTE — ASSESSMENT
[FreeTextEntry1] : 65 y.o female with Class 1 obesity, KRISTA on CPAP, prediabetes on metformin and recently Trulicity, h/o breast cancer with radiation in 2007 with subsequent weight gain, scoliosis - weekly PT, chronic pain presents for weight management.\par \par - defer breakfast for now, until future where she plateaus or starts snacking more at night \par - increase trulicity as tolerated - discuss next visit.  \par - avoid any snacks after dinner except water. fruit only for dessert right after dinner. \par - discussed better intermittent fasting techniques - early dinner, early breakfast, 12-14 hours in between\par - movement meaning stretching/yoga to improve flexibility every day\par - did see phendimetrizine which is a stimulant in her med history, could consider but since she's taking trulicity which is great we will optimize that first.  Could increase to 1.5mg weekly if tolerating well.\par - wants to avoid getting to 200#, first and foremost.  \par - water - 64 oz per day minimum\par \par f/u 3 weeks TEB\par \par Bariatric surgery history: none\par Obesity co-morbidities:krista, prediabetes\par Comorbidities improved or resolved:none\par Anti-obesity medications:trulicity, metformin\par Obesity medication side effects:na\par \par

## 2020-11-25 NOTE — REVIEW OF SYSTEMS
[Negative] : Respiratory [MED-ROS-Cons-FT] : weight gain [MED-ROS-Adi-FT] : chronic pain back [MED-ROS-Integum-FT] : rash, psoriasis

## 2020-11-30 ENCOUNTER — RX RENEWAL (OUTPATIENT)
Age: 65
End: 2020-11-30

## 2020-12-16 PROBLEM — Z87.09 HISTORY OF SORE THROAT: Status: RESOLVED | Noted: 2018-03-07 | Resolved: 2020-12-16

## 2020-12-17 ENCOUNTER — APPOINTMENT (OUTPATIENT)
Dept: BARIATRICS/WEIGHT MGMT | Facility: CLINIC | Age: 65
End: 2020-12-17
Payer: MEDICARE

## 2020-12-17 VITALS — WEIGHT: 193 LBS | BODY MASS INDEX: 33.13 KG/M2

## 2020-12-17 DIAGNOSIS — E03.9 HYPOTHYROIDISM, UNSPECIFIED: ICD-10-CM

## 2020-12-17 DIAGNOSIS — Z29.9 ENCOUNTER FOR PROPHYLACTIC MEASURES, UNSPECIFIED: ICD-10-CM

## 2020-12-17 DIAGNOSIS — R73.03 PREDIABETES.: ICD-10-CM

## 2020-12-17 PROCEDURE — 99214 OFFICE O/P EST MOD 30 MIN: CPT | Mod: 95

## 2020-12-17 NOTE — HISTORY OF PRESENT ILLNESS
[Home] : at home, [unfilled] , at the time of the visit. [Medical Office: ([facility])___] : at the medical office located in  [Verbal consent obtained from patient] : the patient, [unfilled] [FreeTextEntry1] : Ms. PEDRO BURDEN is a 65 year year old female who presents for evaluation and treatment of Class 1 obesity. \par \par Obesity related co-morbidities: Asthma - doesn't limit her - but might to go on prednisone 2-3 times a year, gerd on pepcid and nexium, na with cpap - severe,  hypothyroidism - has Hashimoto's, scoliosis, and fibromyalgia, prediabetes\par \par Interim\par - "struggling with willpower" would like to talk to psychologist\par - She's been on Trulicity 0.75 for a few weeks, no side effects from it. Would like to continue.  Supposed to have endocrinologist f/u today but that got cancelled.  She'd like a month refill and then she can f/u - she wants to stay at the same dose.\par - getting "tempted from her daughters" - they were baking at home, and she was eating some of the things they were baking, also they went to the store and bought some snack foods.  "I can't just have 1 or 2, I have to have 3 or 4 or more"\par - during cooking dinner - she has snacks as well, and then has her dinner.  She hasn't done a food journal recently but she did do one with [facility].  \par - she is not hungry/ no desire to eat breakfast, so she wants to hold off on that for now. \par - she did go through the email - saw some recipes she'd like to try, and liked lentil recipes\par - adding some veggies to the protein.\par \par Continues intermittent fasting\par Diet history:\par +gluten free diet\par wakes up at: \par B: skips b/c she's trying to do intermittent fasting - 14 hours\par L: 1-2pm protein- turkey/sardines, cottage cheese, piece of fruit or dark chocolate, frozen grapes\par D:  6-7pm protein and a vegetable, sometimes a fruit afterward.  \par 8-9pm snack - fruit for dessert, or frozen treat or "small piece of chocolate" - coffee pop, or 100 calorie yogurt\par \par snacks: yes evening\par eating after dinner: yes\par Sodas/fast food/processed foods: no eating out. chocolate a few times a week, dark. \par \par Water intake per day: 16 oz x 3 = 48 oz per day.\par coffee 1 cup twice a week - usually just black with a little truvia, or creamer - vanilla flavored. \par \par Sleep: 7-10 hours. +cpap every night, even when she takes a nap. Developed in 2007. \par \par Physical activity: \par - she's going up and down stairs once - 3-4 times a day just going up and down once for her exercise.  \par Patient enjoys: stretching - daily x15 minutes, short bike rides or walks - not often.\par  PT once a week. \par \par Diabetes - endocrinologist prescribed Trulicity, and gave sample pack.  2 weeks now on 0.75mg weekly.  \par Scoliosis - She does PT for once a week for years. \par Breast cancer - 2 lumpectomies, radiation, hormone treatment for 11 years - tamoxifen - became allergic, and then aromicin, arimidex.  On Lupron as well. Developed severe hot flashes - put on Effexor and neurontin, started in 2007.   \par \par Weight History:\par Lowest adult weight: 105 \par Highest adult weight: 197 \par \par Has gained/lost 5-10 pounds over the past year.  Has been trying the past 4 years to lose weight.\par \par Obesity began in adult - 40s after breast cancer diagnosis.  DId not gain weight in college "ate whatever".  Weight gain has occurred with: Started with 2007 when she got breast cancer "crashed me into menopause."  Age 52 with cancer diagnosis - was around 140 lbs.  Weight gain has been gradual, "a lb here and a lb there", 10-12 lbs per year. Has been relatively stable 180s-190s since 2016 per records.\par \par Past weight loss attempts include:  intermittent fasting, 14 hr, mostly by eating late breakfast or skipping. Weight Watchers several times These have produced a maximum of 0-5 pounds of weight loss.  \par Anti-obesity medications in the past: Trulicity, metformin. \par \par Social history:\par Patient lives -  and 2 daughters - 2nd year college, one in 9th grade. \par Employment status - retired, disabled.

## 2020-12-17 NOTE — ASSESSMENT
[FreeTextEntry1] : 65 y.o female with Class 1 obesity, KRISTA on CPAP, prediabetes on metformin and recently Trulicity, h/o breast cancer with radiation in 2007 with subsequent weight gain, scoliosis - weekly PT, chronic pain presents for weight management.\par \par - will call to make appt with psychologist struggling with "willpower" has many reasons she knows to say no to temptations but is struggling with following through\par - defer breakfast for now, until future where she plateaus or starts snacking more at night \par - increase trulicity as tolerated - does not want to increase today. sent refills for 0.75 today.\par - avoid any snacks after dinner except water. fruit only for dessert right after dinner. And no snacking while cooking dinner - only have dinner.  \par - discussed better intermittent fasting techniques - early dinner, early breakfast, 12-14 hours in between\par - movement meaning stretching/yoga to improve flexibility every day\par - wants to avoid getting up to 200#, first and foremost.  \par - water - 64 oz per day minimum\par \par f/u 3 weeks TEB\par \par Bariatric surgery history: none\par Obesity co-morbidities:krista, prediabetes\par Comorbidities improved or resolved:none\par Anti-obesity medications:trulicity, metformin\par Obesity medication side effects:na\par \par

## 2021-01-13 ENCOUNTER — APPOINTMENT (OUTPATIENT)
Dept: SURGERY | Facility: CLINIC | Age: 66
End: 2021-01-13
Payer: MEDICARE

## 2021-01-13 PROCEDURE — 99213K: CUSTOM

## 2021-01-25 ENCOUNTER — RX RENEWAL (OUTPATIENT)
Age: 66
End: 2021-01-25

## 2021-03-05 ENCOUNTER — APPOINTMENT (OUTPATIENT)
Dept: PULMONOLOGY | Facility: CLINIC | Age: 66
End: 2021-03-05
Payer: MEDICARE

## 2021-03-05 VITALS
TEMPERATURE: 97.2 F | WEIGHT: 191 LBS | RESPIRATION RATE: 16 BRPM | BODY MASS INDEX: 32.61 KG/M2 | OXYGEN SATURATION: 96 % | HEIGHT: 64 IN | DIASTOLIC BLOOD PRESSURE: 80 MMHG | SYSTOLIC BLOOD PRESSURE: 124 MMHG | HEART RATE: 70 BPM

## 2021-03-05 DIAGNOSIS — J45.909 UNSPECIFIED ASTHMA, UNCOMPLICATED: ICD-10-CM

## 2021-03-05 PROCEDURE — 99214 OFFICE O/P EST MOD 30 MIN: CPT

## 2021-03-05 NOTE — ASSESSMENT
[FreeTextEntry1] : Ms. Smith is 65 year old Female who has a history of allergic (IgE mediated) and eosinophilic asthma, allergies, OW, urticaria, GERD, and OSAS.  She is currently stable from a pulmonary perspective except for weight management / joint and arm pain \par \par problem 1: moderate persistent asthma - stable \par -continue Levalbuterol via Hand held nebulizer, Q6H\par -continue Advair  at 2 inhalations BID\par -continue to use Spiriva 1 inhalation QD\par -continue Qvar Redihaler 80 2 puffs BID \par -continue to use Singulair 10 mg before bed \par -rescue inhaler PRN \par \par Asthma is believed to be caused by inherited (genetic) and environmental factor, but its exact cause is unknown. Asthma may be triggered by allergens, lung infections, or irritants in the air. Asthma triggers are different for each person \par -Inhaler technique reviewed as well as oral hygiene techniques reviewed with patient. Avoidance of cold air, extremes of temperature, rescue inhaler should be used before exercise. Order of medication reviewed with patient. Recommended use of a cool mist humidifier in the bedroom. \par \par problem 2: eosinophilic asthma (stable)\par -Candidate for Nucala / Fasenra\par -The safety and efficacy of Nucala was established in three double-blind, randomized, placebo-controlled trials in patients with severe asthma. Compared to a placebo, patients with severe asthma receiving Nucala had fewer exacerbation requiring hospitalization and/or emergency department visits, and a longer time to first exacerbation. In addition, patients with severe asthma receiving Nucala or Fasenra experienced greater reductions in their daily maintenance oral corticosteroid dose, while maintaining asthma control compared with patients receiving placebo. Treatment with Nucala did not result in a significant improvement in lung function, as measured by the volume of air exhaled by patients in one second. The most common side effects include: headache, injection site reactions, back pain, weakness, and fatigue; hypersensitivity reactions can occur within hours or days including swelling of the face, mouth, and tongue, fainting, dizziness, hives, breathing problems, and rash; herpes zoster infections have occurred. The drug is a monoclonal antibody that inhibits interleukin -5 which helps regular eosinophils, a type of white blood cell that contributes to asthma. The over-production of eosinophils can cause inflammation in the lungs, increasing the frequency of asthma attacks. Patients must also take other medications, including high dose inhaled corticosteroids and at least one additional asthma drug\par \par problem 3: allergic (IgE mediated) asthma\par -continue to use Xolair 225 q monthly - attempting weaning. (q 7 weeks)\par -Xolair is a recombinant DNA- derived humanized IgG1K monoclonal antibody that selectively binds ot human immunoglobulin E (IgE). Xolair is produced by a Chinese hamster ovary cell suspension culture in nutrient medium containing the antibiotic gentamicin. Gentamicin is not detectable in the final product. Xolair is a sterile, white, preservative free, lyophilized powder contained in a single use vial that is reconstituted with sterile water for suspension. Side effects include: wheezing, tightness of the chest, trouble breathing, hives, skin rash, feeling anxious or light-headed, fainting, warmth or tingling under skin, or swelling of face, lips, or tongue \par \par problem 4: GERD\par -Add Pepcid 40mg QHS \par -continue generic Nexium 40 mg before breakfast\par -Rule of 2s: avoid eating too much, eating too late, eating too spicy, eating two hours before bed\par -Things to avoid including overeating, spicy foods, tight clothing, eating within three hours of bed, this list is not all inclusive. \par -For treatment of reflux, possible options discussed including diet control, H2 blockers, PPIs, as well as coating motility agents discussed as treatment options. Timing of meals and proximity of last meal to sleep were discussed. If symptoms persist, a formal gastrointestinal evaluation is needed. \par \par problem 5: allergic rhinitis\par -Olopatadine 0.6% 1 sniff each nostril BID \par -continue to use Veramyst 1 sniff each nostril BID\par -continue Xyzal 5 mg before bed \par \par -Environmental measures for allergies were encouraged including mattress and pillow cover, air purifier, and environmental controls. \par \par problem 6: KRISTA\par -continue to use CPAP machine - tolerating it well and seeing benefits\par -recommended to use Insomnitol prn\par \par -Sleep apnea is associated with adverse clinical consequences which an affect most organ systems. Cardiovascular disease risk includes arrhythmias, atrial fibrillation, hypertension, coronary artery disease, and stroke. Metabolic disorders include diabetes type 2, non-alcoholic fatty liver disease. Mood disorder especially depression; and cognitive decline especially in the elderly. Associations with chronic reflux/Dennis’s esophagus some but not all inclusive. \par -Reasons to assess this include arousal consistent with hypopnea; respiratory events most prominent in REM sleep or supine position; therefore sleep staging and body position are important for accurate diagnosis and estimation of AHI. \par \par problem 7: leg pain (improved)\par -recommended to continue f/u with Dr. Cheyanne Yousif\par -recommended continue to use L-carnitine\par -being added CoQ-10 at 200 mg\par -being added vitamin D 50,000 units once a month\par \par problem 8: obesity\par -Recommend she see Dr. Paniagua/ [facility] \par -Weight loss, exercise, and diet control were discussed and are highly encouraged. Treatment options were given such as, aqua therapy, and contacting a nutritionist. Recommended to use the elliptical, stationary bike, less use of treadmill. Obesity is associated with worsening asthma, shortness of breath, and potential for cardiac disease, diabetes, and other underlying medical conditions. \par -recommended to consider yoga\par \par problem 9: idiopathic urticaria\par - It is recommended that she hold all of her supplements and then restart them one at a time. \par -continue to use Xolair 225 q monthly - attempting taper to off. \par \par problem 10: fatigue\par -recommended to go gluten free\par -recommended to look at number of hours she is sleeping as well as her CPAP pressures \par \par problem 11: health maintenance \par - recommended SPM \par - s/p both COVID-19 vaccines (Moderna) \par -s/p flu shot - 2019\par -recommended strep pneumonia vaccines: Prevnar-13 vaccine, followed by Pneumo vaccine 23 one year following\par -recommended early intervention for URIs\par -recommended regular osteoporosis evaluations\par -recommended early dermatological evaluations\par -recommended after the age of 50 to the age of 70, colonoscopy every 5 years \par  \par \par F/U in 4 months - SPI / [facility]IOX\par She is encouraged to call with any changes, concerns, or questions.

## 2021-03-05 NOTE — HISTORY OF PRESENT ILLNESS
[FreeTextEntry1] : Ms. Smith is a 65 year old female with a history of allergic rhinitis, asthma, GERD, obesity, low vitamin D, KRISTA, eosinophilic asthma, and cough presenting to the office today for a follow up visit. Her chief complaint is \par - she has been double vaccinated for COVID, Moderna. \par - no headaches / nausea / vomiting \par - no chest pains / pressure \par - bowel movements are regular \par - no sour taste in the mouth \par - she notes she has not been exercising much. She tries but then shes limited by muscle / back pain \par - no palpitations\par - weight has been stable \par - no ankle / leg swelling \par - she notes her issue is her weight \par - she notes she has been using ice for her aches / pains \par - sleep has been okay \par - she has been sleeping about 8 hours a night, interrupted. sometimes she wakes up on her own and sometimes family members wake her up. \par - she notes one of her doctors wanted her to try trulicity to see if it could help with her weight \par - she feels she has lost a lb or two on trulicity \par - she notes shes on Xolair \par - She  denies any visual issues, headaches, nausea, vomiting, fever, chills, sweats, chest pains, chest pressure, diarrhea, constipation, dysphagia, myalgia, dizziness, leg swelling, leg pain, itchy eyes, itchy ears, heartburn, reflux, or sour taste in the mouth.

## 2021-03-05 NOTE — ADDENDUM
[FreeTextEntry1] : Documented by Mar Turner acting as a scribe for Dr. Korey Bautista on 03/05/2021 \par \par All medical record entries made by the Scribe were at my, Dr. Korey Bautista's, direction and personally dictated by me on 03/05/2021 . I have reviewed the chart and agree that the record accurately reflects my personal performance of the history, physical exam, assessment and plan. I have also personally directed, reviewed, and agree with the discharge instructions.

## 2021-05-11 ENCOUNTER — RX RENEWAL (OUTPATIENT)
Age: 66
End: 2021-05-11

## 2021-07-22 ENCOUNTER — NON-APPOINTMENT (OUTPATIENT)
Age: 66
End: 2021-07-22

## 2021-07-22 ENCOUNTER — APPOINTMENT (OUTPATIENT)
Dept: PULMONOLOGY | Facility: CLINIC | Age: 66
End: 2021-07-22
Payer: MEDICARE

## 2021-07-22 VITALS
HEART RATE: 85 BPM | OXYGEN SATURATION: 96 % | DIASTOLIC BLOOD PRESSURE: 70 MMHG | SYSTOLIC BLOOD PRESSURE: 120 MMHG | HEIGHT: 64 IN | WEIGHT: 192 LBS | BODY MASS INDEX: 32.78 KG/M2 | RESPIRATION RATE: 16 BRPM | TEMPERATURE: 97.4 F

## 2021-07-22 PROCEDURE — 95012 NITRIC OXIDE EXP GAS DETER: CPT

## 2021-07-22 PROCEDURE — 94010 BREATHING CAPACITY TEST: CPT

## 2021-07-22 PROCEDURE — 99214 OFFICE O/P EST MOD 30 MIN: CPT | Mod: 25

## 2021-07-22 NOTE — HISTORY OF PRESENT ILLNESS
[FreeTextEntry1] : Ms. Smith is a 65 year old female with a history of allergic rhinitis, asthma, GERD, obesity, low vitamin D, KRISTA, eosinophilic asthma, and cough presenting to the office today for a follow up visit. Her chief complaint is \par -she notes her energy level is fine\par -she notes problems with a pinched nerve in her back radiating down\par -she notes the back pain has been present since 4/2021\par -she notes the back pain is impeding her ability to exercise\par -she notes seeing pain specialists and PT\par -she notes the pain was extremely severe and got better but is now getting worse again\par -she notes the doctors think it may be related to her scoliosis\par -she notes her sleep has been good\par -she notes her bowels are regular \par -she notes her sense of smell and taste are good\par -she notes getting 8-9 hours of sleep per night\par -she notes using the CPAP whenever she is laying down or sleeping\par -she notes feeling like she is suffocating if she does not wear the CPAP\par -she notes her biggest complaint is now her back and her weight is #2\par -she notes still using the Xolair (every 7 weeks)\par \par patient denies any headaches, nausea, vomiting, fever, chills, sweats, chest pain, chest pressure, palpitations, coughing, wheezing, fatigue, diarrhea, constipation, dysphagia, myalgias, dizziness, leg swelling, leg pain, itchy eyes, itchy ears, heartburn, reflux or sour taste in the mouth

## 2021-07-22 NOTE — ADDENDUM
[FreeTextEntry1] : Documented by Frandy Chen acting as a scribe for Dr. Korey Bautista on (07/22/2021).\par \par All medical record entries made by the Scribe were at my, Dr. Korey Bautista's, direction and personally dictated by me on (07/22/2021). I have reviewed the chart and agree that the record accurately reflects my personal performance of the history, physical exam, assessment and plan. I have also personally directed, reviewed, and agree with the discharge instructions.\par

## 2021-07-22 NOTE — PROCEDURE
[FreeTextEntry1] : Feno was 32; a normal value being less than 25. Fractional exhaled nitric oxide (FENO) is regarded as a simple, noninvasive method for assessing eosinophilic airway inflammation. Produced by a variety of cells within the lung, nitric oxide (NO) concentrations are generally low in healthy individuals. However, high concentrations of NO appear to be involved in nonspecific host defense mechanisms and chronic inflammatory  diseases such as asthma. The American Thoracic Society (ATS) therefore recommended using FENO to aid in the diagnosis and monitoring of eosinophilic airway inflammation and asthma, and for identifying steroid responsive individuals whose chronic respiratory symptoms may be caused by airway inflammation \par \par PFT revealed normal flows, with a FEV1 of 2.11L, which is 87% of predicted, with a normal flow volume loop\par \par -Images and procedures reviewed in detail and discussed with patient.

## 2021-07-22 NOTE — ASSESSMENT
[FreeTextEntry1] : Ms. Smith is 65 year old Female who has a history of allergic (IgE mediated) and eosinophilic asthma, allergies, OW, urticaria, GERD, and OSAS.  She is currently stable from a pulmonary perspective except for weight management / back pain\par \par problem 1: moderate persistent asthma - stable \par -continue Levalbuterol via Hand held nebulizer, Q6H\par -continue Advair  at 2 inhalations BID\par -continue to use Spiriva 1 inhalation QD\par -continue Qvar Redihaler 80 2 puffs BID \par -continue to use Singulair 10 mg before bed \par -rescue inhaler PRN \par \par Asthma is believed to be caused by inherited (genetic) and environmental factor, but its exact cause is unknown. Asthma may be triggered by allergens, lung infections, or irritants in the air. Asthma triggers are different for each person \par -Inhaler technique reviewed as well as oral hygiene techniques reviewed with patient. Avoidance of cold air, extremes of temperature, rescue inhaler should be used before exercise. Order of medication reviewed with patient. Recommended use of a cool mist humidifier in the bedroom. \par \par problem 2: eosinophilic asthma (stable)\par -Candidate for Nucala / Fasenra\par -The safety and efficacy of Nucala was established in three double-blind, randomized, placebo-controlled trials in patients with severe asthma. Compared to a placebo, patients with severe asthma receiving Nucala had fewer exacerbation requiring hospitalization and/or emergency department visits, and a longer time to first exacerbation. In addition, patients with severe asthma receiving Nucala or Fasenra experienced greater reductions in their daily maintenance oral corticosteroid dose, while maintaining asthma control compared with patients receiving placebo. Treatment with Nucala did not result in a significant improvement in lung function, as measured by the volume of air exhaled by patients in one second. The most common side effects include: headache, injection site reactions, back pain, weakness, and fatigue; hypersensitivity reactions can occur within hours or days including swelling of the face, mouth, and tongue, fainting, dizziness, hives, breathing problems, and rash; herpes zoster infections have occurred. The drug is a monoclonal antibody that inhibits interleukin -5 which helps regular eosinophils, a type of white blood cell that contributes to asthma. The over-production of eosinophils can cause inflammation in the lungs, increasing the frequency of asthma attacks. Patients must also take other medications, including high dose inhaled corticosteroids and at least one additional asthma drug\par \par problem 3: allergic (IgE mediated) asthma\par -continue to use Xolair 225 q monthly - attempting weaning. (q 7 weeks)\par -Xolair is a recombinant DNA- derived humanized IgG1K monoclonal antibody that selectively binds ot human immunoglobulin E (IgE). Xolair is produced by a Chinese hamster ovary cell suspension culture in nutrient medium containing the antibiotic gentamicin. Gentamicin is not detectable in the final product. Xolair is a sterile, white, preservative free, lyophilized powder contained in a single use vial that is reconstituted with sterile water for suspension. Side effects include: wheezing, tightness of the chest, trouble breathing, hives, skin rash, feeling anxious or light-headed, fainting, warmth or tingling under skin, or swelling of face, lips, or tongue \par \par problem 4: GERD\par -Add Pepcid 40mg QHS \par -continue generic Nexium 40 mg before breakfast\par -Rule of 2s: avoid eating too much, eating too late, eating too spicy, eating two hours before bed\par -Things to avoid including overeating, spicy foods, tight clothing, eating within three hours of bed, this list is not all inclusive. \par -For treatment of reflux, possible options discussed including diet control, H2 blockers, PPIs, as well as coating motility agents discussed as treatment options. Timing of meals and proximity of last meal to sleep were discussed. If symptoms persist, a formal gastrointestinal evaluation is needed. \par \par problem 5: allergic rhinitis\par -Olopatadine 0.6% 1 sniff each nostril BID \par -continue to use Veramyst 1 sniff each nostril BID\par -continue Xyzal 5 mg before bed \par \par -Environmental measures for allergies were encouraged including mattress and pillow cover, air purifier, and environmental controls. \par \par problem 6: KRISTA\par -continue to use CPAP machine - tolerating it well and seeing benefits\par -recommended to use Insomnitol prn\par \par -Sleep apnea is associated with adverse clinical consequences which an affect most organ systems. Cardiovascular disease risk includes arrhythmias, atrial fibrillation, hypertension, coronary artery disease, and stroke. Metabolic disorders include diabetes type 2, non-alcoholic fatty liver disease. Mood disorder especially depression; and cognitive decline especially in the elderly. Associations with chronic reflux/Dennis’s esophagus some but not all inclusive. \par -Reasons to assess this include arousal consistent with hypopnea; respiratory events most prominent in REM sleep or supine position; therefore sleep staging and body position are important for accurate diagnosis and estimation of AHI. \par \par problem 7: leg pain (improved) / back pain\par -Recommend seeing Dio Duenas (chiropractor)\par -recommended to continue f/u with Dr. Cheyanne Yousif\par -recommended continue to use L-carnitine\par -being added CoQ-10 at 200 mg\par -being added vitamin D 50,000 units once a month\par \par problem 8: obesity\par -Recommend she see Dr. Paniagua/ [facility] \par -Weight loss, exercise, and diet control were discussed and are highly encouraged. Treatment options were given such as, aqua therapy, and contacting a nutritionist. Recommended to use the elliptical, stationary bike, less use of treadmill. Obesity is associated with worsening asthma, shortness of breath, and potential for cardiac disease, diabetes, and other underlying medical conditions. \par -recommended to consider yoga\par \par problem 9: idiopathic urticaria\par - It is recommended that she hold all of her supplements and then restart them one at a time. \par -continue to use Xolair 225 q monthly - attempting taper to off. \par \par problem 10: fatigue\par -recommended to go gluten free\par -recommended to look at number of hours she is sleeping as well as her CPAP pressures \par \par problem 11: health maintenance \par -Recommend seeing Dio Duenas for back pain\par - recommended SPM \par - s/p both COVID-19 vaccines (Moderna) \par -s/p flu shot - 2020\par -recommended strep pneumonia vaccines: Prevnar-13 vaccine, followed by Pneumo vaccine 23 one year following\par -recommended early intervention for URIs\par -recommended regular osteoporosis evaluations\par -recommended early dermatological evaluations\par -recommended after the age of 50 to the age of 70, colonoscopy every 5 years \par  \par \par F/U in 4 months - SPI / NIOX\par She is encouraged to call with any changes, concerns, or questions.

## 2021-08-13 NOTE — END OF VISIT
[Time Spent: ___ minutes] : I have spent [unfilled] minutes of time on the encounter. [>50% of the face to face encounter time was spent on counseling and/or coordination of care for ___] : Greater than 50% of the face to face encounter time was spent on counseling and/or coordination of care for [unfilled] 14-Aug-2021 02:19

## 2021-10-13 ENCOUNTER — RESULT REVIEW (OUTPATIENT)
Age: 66
End: 2021-10-13

## 2021-10-13 ENCOUNTER — APPOINTMENT (OUTPATIENT)
Dept: ULTRASOUND IMAGING | Facility: CLINIC | Age: 66
End: 2021-10-13
Payer: MEDICARE

## 2021-10-13 ENCOUNTER — OUTPATIENT (OUTPATIENT)
Dept: OUTPATIENT SERVICES | Facility: HOSPITAL | Age: 66
LOS: 1 days | End: 2021-10-13
Payer: MEDICARE

## 2021-10-13 ENCOUNTER — APPOINTMENT (OUTPATIENT)
Dept: MAMMOGRAPHY | Facility: CLINIC | Age: 66
End: 2021-10-13
Payer: MEDICARE

## 2021-10-13 DIAGNOSIS — Z00.8 ENCOUNTER FOR OTHER GENERAL EXAMINATION: ICD-10-CM

## 2021-10-13 PROCEDURE — 77063 BREAST TOMOSYNTHESIS BI: CPT | Mod: 26

## 2021-10-13 PROCEDURE — 77067 SCR MAMMO BI INCL CAD: CPT | Mod: 26

## 2021-10-13 PROCEDURE — 77067 SCR MAMMO BI INCL CAD: CPT

## 2021-10-13 PROCEDURE — 76641 ULTRASOUND BREAST COMPLETE: CPT | Mod: 26,50

## 2021-10-13 PROCEDURE — 76641 ULTRASOUND BREAST COMPLETE: CPT

## 2021-10-13 PROCEDURE — 77063 BREAST TOMOSYNTHESIS BI: CPT

## 2021-10-19 ENCOUNTER — RX RENEWAL (OUTPATIENT)
Age: 66
End: 2021-10-19

## 2021-11-16 ENCOUNTER — RX RENEWAL (OUTPATIENT)
Age: 66
End: 2021-11-16

## 2021-11-18 ENCOUNTER — EMERGENCY (EMERGENCY)
Facility: HOSPITAL | Age: 66
LOS: 1 days | Discharge: HOME | End: 2021-11-18
Attending: EMERGENCY MEDICINE
Payer: MEDICARE

## 2021-11-18 VITALS
RESPIRATION RATE: 18 BRPM | SYSTOLIC BLOOD PRESSURE: 170 MMHG | WEIGHT: 195.11 LBS | DIASTOLIC BLOOD PRESSURE: 94 MMHG | TEMPERATURE: 98 F | HEART RATE: 84 BPM | OXYGEN SATURATION: 97 % | HEIGHT: 64 IN

## 2021-11-18 VITALS
HEART RATE: 81 BPM | SYSTOLIC BLOOD PRESSURE: 140 MMHG | TEMPERATURE: 98 F | RESPIRATION RATE: 20 BRPM | OXYGEN SATURATION: 97 % | DIASTOLIC BLOOD PRESSURE: 91 MMHG

## 2021-11-18 DIAGNOSIS — Z98.890 OTHER SPECIFIED POSTPROCEDURAL STATES: Chronic | ICD-10-CM

## 2021-11-18 LAB
ALBUMIN SERPL ELPH-MCNC: 4.7 G/DL — SIGNIFICANT CHANGE UP (ref 3.3–5)
ALP SERPL-CCNC: 64 U/L — SIGNIFICANT CHANGE UP (ref 40–120)
ALT FLD-CCNC: 25 U/L — SIGNIFICANT CHANGE UP (ref 10–45)
ANION GAP SERPL CALC-SCNC: 13 MMOL/L — SIGNIFICANT CHANGE UP (ref 5–17)
APPEARANCE UR: CLEAR — SIGNIFICANT CHANGE UP
AST SERPL-CCNC: 28 U/L — SIGNIFICANT CHANGE UP (ref 10–40)
BASE EXCESS BLDV CALC-SCNC: 3.2 MMOL/L — HIGH (ref -2–2)
BASOPHILS # BLD AUTO: 0.05 K/UL — SIGNIFICANT CHANGE UP (ref 0–0.2)
BASOPHILS NFR BLD AUTO: 0.7 % — SIGNIFICANT CHANGE UP (ref 0–2)
BILIRUB SERPL-MCNC: 0.2 MG/DL — SIGNIFICANT CHANGE UP (ref 0.2–1.2)
BILIRUB UR-MCNC: NEGATIVE — SIGNIFICANT CHANGE UP
BUN SERPL-MCNC: 13 MG/DL — SIGNIFICANT CHANGE UP (ref 7–23)
CA-I SERPL-SCNC: 1.32 MMOL/L — SIGNIFICANT CHANGE UP (ref 1.15–1.33)
CALCIUM SERPL-MCNC: 10.6 MG/DL — HIGH (ref 8.4–10.5)
CHLORIDE BLDV-SCNC: 102 MMOL/L — SIGNIFICANT CHANGE UP (ref 96–108)
CHLORIDE SERPL-SCNC: 100 MMOL/L — SIGNIFICANT CHANGE UP (ref 96–108)
CO2 BLDV-SCNC: 30 MMOL/L — HIGH (ref 22–26)
CO2 SERPL-SCNC: 24 MMOL/L — SIGNIFICANT CHANGE UP (ref 22–31)
COLOR SPEC: SIGNIFICANT CHANGE UP
CREAT SERPL-MCNC: 0.67 MG/DL — SIGNIFICANT CHANGE UP (ref 0.5–1.3)
DIFF PNL FLD: NEGATIVE — SIGNIFICANT CHANGE UP
EOSINOPHIL # BLD AUTO: 0.12 K/UL — SIGNIFICANT CHANGE UP (ref 0–0.5)
EOSINOPHIL NFR BLD AUTO: 1.6 % — SIGNIFICANT CHANGE UP (ref 0–6)
GAS PNL BLDV: 136 MMOL/L — SIGNIFICANT CHANGE UP (ref 136–145)
GAS PNL BLDV: SIGNIFICANT CHANGE UP
GAS PNL BLDV: SIGNIFICANT CHANGE UP
GLUCOSE BLDV-MCNC: 108 MG/DL — HIGH (ref 70–99)
GLUCOSE SERPL-MCNC: 112 MG/DL — HIGH (ref 70–99)
GLUCOSE UR QL: NEGATIVE — SIGNIFICANT CHANGE UP
HCO3 BLDV-SCNC: 28 MMOL/L — SIGNIFICANT CHANGE UP (ref 22–29)
HCT VFR BLD CALC: 40.5 % — SIGNIFICANT CHANGE UP (ref 34.5–45)
HCT VFR BLDA CALC: 41 % — SIGNIFICANT CHANGE UP (ref 34.5–46.5)
HGB BLD CALC-MCNC: 13.5 G/DL — SIGNIFICANT CHANGE UP (ref 11.7–16.1)
HGB BLD-MCNC: 13.5 G/DL — SIGNIFICANT CHANGE UP (ref 11.5–15.5)
IMM GRANULOCYTES NFR BLD AUTO: 0.3 % — SIGNIFICANT CHANGE UP (ref 0–1.5)
KETONES UR-MCNC: NEGATIVE — SIGNIFICANT CHANGE UP
LACTATE BLDV-MCNC: 1.8 MMOL/L — SIGNIFICANT CHANGE UP (ref 0.7–2)
LEUKOCYTE ESTERASE UR-ACNC: NEGATIVE — SIGNIFICANT CHANGE UP
LIDOCAIN IGE QN: 27 U/L — SIGNIFICANT CHANGE UP (ref 7–60)
LYMPHOCYTES # BLD AUTO: 1.94 K/UL — SIGNIFICANT CHANGE UP (ref 1–3.3)
LYMPHOCYTES # BLD AUTO: 25.8 % — SIGNIFICANT CHANGE UP (ref 13–44)
MCHC RBC-ENTMCNC: 29.4 PG — SIGNIFICANT CHANGE UP (ref 27–34)
MCHC RBC-ENTMCNC: 33.3 GM/DL — SIGNIFICANT CHANGE UP (ref 32–36)
MCV RBC AUTO: 88.2 FL — SIGNIFICANT CHANGE UP (ref 80–100)
MONOCYTES # BLD AUTO: 0.83 K/UL — SIGNIFICANT CHANGE UP (ref 0–0.9)
MONOCYTES NFR BLD AUTO: 11.1 % — SIGNIFICANT CHANGE UP (ref 2–14)
NEUTROPHILS # BLD AUTO: 4.55 K/UL — SIGNIFICANT CHANGE UP (ref 1.8–7.4)
NEUTROPHILS NFR BLD AUTO: 60.5 % — SIGNIFICANT CHANGE UP (ref 43–77)
NITRITE UR-MCNC: NEGATIVE — SIGNIFICANT CHANGE UP
NRBC # BLD: 0 /100 WBCS — SIGNIFICANT CHANGE UP (ref 0–0)
PCO2 BLDV: 45 MMHG — HIGH (ref 39–42)
PH BLDV: 7.41 — SIGNIFICANT CHANGE UP (ref 7.32–7.43)
PH UR: 7.5 — SIGNIFICANT CHANGE UP (ref 5–8)
PLATELET # BLD AUTO: 279 K/UL — SIGNIFICANT CHANGE UP (ref 150–400)
PO2 BLDV: 49 MMHG — HIGH (ref 25–45)
POTASSIUM BLDV-SCNC: 5.1 MMOL/L — SIGNIFICANT CHANGE UP (ref 3.5–5.1)
POTASSIUM SERPL-MCNC: 4.6 MMOL/L — SIGNIFICANT CHANGE UP (ref 3.5–5.3)
POTASSIUM SERPL-SCNC: 4.6 MMOL/L — SIGNIFICANT CHANGE UP (ref 3.5–5.3)
PROT SERPL-MCNC: 7.6 G/DL — SIGNIFICANT CHANGE UP (ref 6–8.3)
PROT UR-MCNC: SIGNIFICANT CHANGE UP
RBC # BLD: 4.59 M/UL — SIGNIFICANT CHANGE UP (ref 3.8–5.2)
RBC # FLD: 13.2 % — SIGNIFICANT CHANGE UP (ref 10.3–14.5)
SAO2 % BLDV: 81.6 % — SIGNIFICANT CHANGE UP (ref 67–88)
SARS-COV-2 RNA SPEC QL NAA+PROBE: SIGNIFICANT CHANGE UP
SODIUM SERPL-SCNC: 137 MMOL/L — SIGNIFICANT CHANGE UP (ref 135–145)
SP GR SPEC: 1.01 — SIGNIFICANT CHANGE UP (ref 1.01–1.02)
UROBILINOGEN FLD QL: NEGATIVE — SIGNIFICANT CHANGE UP
WBC # BLD: 7.51 K/UL — SIGNIFICANT CHANGE UP (ref 3.8–10.5)
WBC # FLD AUTO: 7.51 K/UL — SIGNIFICANT CHANGE UP (ref 3.8–10.5)

## 2021-11-18 PROCEDURE — 83605 ASSAY OF LACTIC ACID: CPT

## 2021-11-18 PROCEDURE — 84132 ASSAY OF SERUM POTASSIUM: CPT

## 2021-11-18 PROCEDURE — 82947 ASSAY GLUCOSE BLOOD QUANT: CPT

## 2021-11-18 PROCEDURE — 82435 ASSAY OF BLOOD CHLORIDE: CPT

## 2021-11-18 PROCEDURE — 87086 URINE CULTURE/COLONY COUNT: CPT

## 2021-11-18 PROCEDURE — 74177 CT ABD & PELVIS W/CONTRAST: CPT | Mod: 26,MA

## 2021-11-18 PROCEDURE — 83690 ASSAY OF LIPASE: CPT

## 2021-11-18 PROCEDURE — 99284 EMERGENCY DEPT VISIT MOD MDM: CPT

## 2021-11-18 PROCEDURE — 74177 CT ABD & PELVIS W/CONTRAST: CPT | Mod: MA

## 2021-11-18 PROCEDURE — 85014 HEMATOCRIT: CPT

## 2021-11-18 PROCEDURE — 82803 BLOOD GASES ANY COMBINATION: CPT

## 2021-11-18 PROCEDURE — U0003: CPT

## 2021-11-18 PROCEDURE — 82330 ASSAY OF CALCIUM: CPT

## 2021-11-18 PROCEDURE — 81003 URINALYSIS AUTO W/O SCOPE: CPT

## 2021-11-18 PROCEDURE — 36415 COLL VENOUS BLD VENIPUNCTURE: CPT

## 2021-11-18 PROCEDURE — 84295 ASSAY OF SERUM SODIUM: CPT

## 2021-11-18 PROCEDURE — 99284 EMERGENCY DEPT VISIT MOD MDM: CPT | Mod: 25

## 2021-11-18 PROCEDURE — 80053 COMPREHEN METABOLIC PANEL: CPT

## 2021-11-18 PROCEDURE — 85025 COMPLETE CBC W/AUTO DIFF WBC: CPT

## 2021-11-18 PROCEDURE — 85018 HEMOGLOBIN: CPT

## 2021-11-18 PROCEDURE — U0005: CPT

## 2021-11-18 RX ADMIN — Medication 1 TABLET(S): at 19:29

## 2021-11-18 NOTE — ED PROVIDER NOTE - PATIENT PORTAL LINK FT
You can access the FollowMyHealth Patient Portal offered by [facility] by registering at the following website: [email or web address]. By joining [employer]’s FollowMyHealth portal, you will also be able to view your health information using other applications (apps) compatible with our system.

## 2021-11-18 NOTE — ED PROVIDER NOTE - PROGRESS NOTE DETAILS
CTAP shows acute uncomplicated sigmoid diverticulitis. Spoke with Dr. Nikunj Christianson, patient's GI specialist. Patient to be dc on PO Augmentin for 7 days, and outpatient follow up.

## 2021-11-18 NOTE — ED PROVIDER NOTE - CHIEF COMPLAINT
The patient is a 66y Female complaining of  The patient is a 66y Female complaining of abdominal pain

## 2021-11-18 NOTE — ED PROVIDER NOTE - NSFOLLOWUPINSTRUCTIONS_ED_ALL_ED_FT
Please take the prescribed antibiotics Augmentin twice daily for a total of 7 days.     Please follow up with your GI specialist within the next 3-5 days to monitor the progression of your symptoms.     Please come back to the Emergency Room if your symptoms get worse or if you start developing fever, chest pain, shortness of breath, severe abdominal pain, blood in stool.     Diverticulitis    WHAT YOU NEED TO KNOW:    Diverticulitis is a condition that causes small pockets along your intestine called diverticula to become inflamed or infected. This is caused by hard bowel movements, food, or bacteria that get stuck in the pockets.    DISCHARGE INSTRUCTIONS:    Seek care immediately if:    You have bowel movement or foul-smelling discharge leaking from your vagina or in your urine.    You have severe diarrhea.    You urinate less than usual or not at all.    You are not able to have a bowel movement.    You cannot stop vomiting.    You have severe abdominal pain, a fever, and your abdomen is larger than usual.    You have new or increased blood in your bowel movements.  Contact your healthcare provider if:    You have pain when you urinate.    Your symptoms get worse or do not go away.    You have questions or concerns about your condition or care.  Medicines:    Antibiotics may be given to help prevent or treat a bacterial infection.    Prescription pain medicine may be given. Ask your healthcare provider how to take this medicine safely. Some prescription pain medicines contain acetaminophen. Do not take other medicines that contain acetaminophen without talking to your healthcare provider. Too much acetaminophen may cause liver damage. Prescription pain medicine may cause constipation. Ask your healthcare provider how to prevent or treat constipation.    Take your medicine as directed. Contact your healthcare provider if you think your medicine is not helping or if you have side effects. Tell him or her if you are allergic to any medicine. Keep a list of the medicines, vitamins, and herbs you take. Include the amounts, and when and why you take them. Bring the list or the pill bottles to follow-up visits. Carry your medicine list with you in case of an emergency.  Clear liquid diet: A clear liquid diet includes any liquids that you can see through. Examples include water, ginger-john paul, cranberry or apple juice, frozen fruit ice, or broth. Stay on a clear liquid diet until your symptoms are gone, or as directed.    Follow up with your healthcare provider as directed: You may need to return for a colonoscopy. When your symptoms are gone, you may need a low-fat, high-fiber diet to help prevent diverticulitis from developing again. Your healthcare provider or dietitian can help you create meal plans. Write down your questions so you remember to ask them during your visits.    Diverticulitis Diet    WHAT YOU NEED TO KNOW:    A diverticulitis diet includes foods that allow your intestines to rest while you have diverticulitis. Diverticulitis is a condition that causes small pockets along your intestine called diverticula to become inflamed or infected. This is caused by hard bowel movement, food, or bacteria that get stuck in the pockets.    DISCHARGE INSTRUCTIONS:    Foods that may be recommended while you have diverticulitis:    A clear liquid diet may be recommended for 2 to 3 days. A clear liquid diet includes clear liquids, and foods that are liquid at room temperature. Examples include the following:  Water and clear juices (such as apple, cranberry, or grape), strained citrus juices or fruit punch    Coffee or tea (without cream or milk)    Clear sports drinks or soft drinks, such as ginger ale, lemon-lime soda, or club soda (no cola or root beer)    Clear broth, bouillon, or consommé    Plain popsicles (no popsicles with pureed fruit or fiber)    Flavored gelatin without fruit    Low-fiber foods may be recommended until your symptoms improve. Examples include the following:  Cream of wheat and finely ground grits    White bread, white pasta, and white rice    Canned and well-cooked fruit without skins or seeds, and juice without pulp    Canned and well-cooked vegetables without skins or seeds, and vegetable juice    Cow's milk, lactose-free milk, soy milk, and rice milk    Yogurt, cottage cheese, and sherbet    Eggs, poultry (such as chicken and turkey), fish, and tender, ground, well-cooked beef    Tofu and smooth nut butters, such as peanut butter    Broth and strained soups made of low-fiber foods  High-fiber foods can help prevent diverticulosis and diverticulitis. Your healthcare provider will tell you when you can add high-fiber foods back into your diet. Examples include the following:    Whole grains and breads, and cereals made with whole grains    Dried fruit, fresh fruit with skin, and fruit pulp    Raw vegetables    Cooked greens, such as spinach    Tough meat and meat with gristle    Legumes, such as fernandez beans and lentils  Contact your healthcare provider if:    Your symptoms do not get better, or they get worse.    You have questions about the foods you should eat.    You have questions or concerns about your condition or care. CT scan of abdomen shows that you have diverticulitis.     Please take the prescribed antibiotics Augmentin twice daily for a total of 7 days.     Please follow up with your GI specialist within the next 3-5 days to monitor the progression of your symptoms.     Please come back to the Emergency Room if your symptoms get worse or if you start developing fever, chest pain, shortness of breath, severe abdominal pain, blood in stool.         Diverticulitis    WHAT YOU NEED TO KNOW:    Diverticulitis is a condition that causes small pockets along your intestine called diverticula to become inflamed or infected. This is caused by hard bowel movements, food, or bacteria that get stuck in the pockets.    DISCHARGE INSTRUCTIONS:    Seek care immediately if:    You have bowel movement or foul-smelling discharge leaking from your vagina or in your urine.    You have severe diarrhea.    You urinate less than usual or not at all.    You are not able to have a bowel movement.    You cannot stop vomiting.    You have severe abdominal pain, a fever, and your abdomen is larger than usual.    You have new or increased blood in your bowel movements.  Contact your healthcare provider if:    You have pain when you urinate.    Your symptoms get worse or do not go away.    You have questions or concerns about your condition or care.  Medicines:    Antibiotics may be given to help prevent or treat a bacterial infection.    Prescription pain medicine may be given. Ask your healthcare provider how to take this medicine safely. Some prescription pain medicines contain acetaminophen. Do not take other medicines that contain acetaminophen without talking to your healthcare provider. Too much acetaminophen may cause liver damage. Prescription pain medicine may cause constipation. Ask your healthcare provider how to prevent or treat constipation.    Take your medicine as directed. Contact your healthcare provider if you think your medicine is not helping or if you have side effects. Tell him or her if you are allergic to any medicine. Keep a list of the medicines, vitamins, and herbs you take. Include the amounts, and when and why you take them. Bring the list or the pill bottles to follow-up visits. Carry your medicine list with you in case of an emergency.  Clear liquid diet: A clear liquid diet includes any liquids that you can see through. Examples include water, ginger-john paul, cranberry or apple juice, frozen fruit ice, or broth. Stay on a clear liquid diet until your symptoms are gone, or as directed.    Follow up with your healthcare provider as directed: You may need to return for a colonoscopy. When your symptoms are gone, you may need a low-fat, high-fiber diet to help prevent diverticulitis from developing again. Your healthcare provider or dietitian can help you create meal plans. Write down your questions so you remember to ask them during your visits.    Diverticulitis Diet    WHAT YOU NEED TO KNOW:    A diverticulitis diet includes foods that allow your intestines to rest while you have diverticulitis. Diverticulitis is a condition that causes small pockets along your intestine called diverticula to become inflamed or infected. This is caused by hard bowel movement, food, or bacteria that get stuck in the pockets.    DISCHARGE INSTRUCTIONS:    Foods that may be recommended while you have diverticulitis:    A clear liquid diet may be recommended for 2 to 3 days. A clear liquid diet includes clear liquids, and foods that are liquid at room temperature. Examples include the following:  Water and clear juices (such as apple, cranberry, or grape), strained citrus juices or fruit punch    Coffee or tea (without cream or milk)    Clear sports drinks or soft drinks, such as ginger ale, lemon-lime soda, or club soda (no cola or root beer)    Clear broth, bouillon, or consommé    Plain popsicles (no popsicles with pureed fruit or fiber)    Flavored gelatin without fruit    Low-fiber foods may be recommended until your symptoms improve. Examples include the following:  Cream of wheat and finely ground grits    White bread, white pasta, and white rice    Canned and well-cooked fruit without skins or seeds, and juice without pulp    Canned and well-cooked vegetables without skins or seeds, and vegetable juice    Cow's milk, lactose-free milk, soy milk, and rice milk    Yogurt, cottage cheese, and sherbet    Eggs, poultry (such as chicken and turkey), fish, and tender, ground, well-cooked beef    Tofu and smooth nut butters, such as peanut butter    Broth and strained soups made of low-fiber foods  High-fiber foods can help prevent diverticulosis and diverticulitis. Your healthcare provider will tell you when you can add high-fiber foods back into your diet. Examples include the following:    Whole grains and breads, and cereals made with whole grains    Dried fruit, fresh fruit with skin, and fruit pulp    Raw vegetables    Cooked greens, such as spinach    Tough meat and meat with gristle    Legumes, such as fernandez beans and lentils  Contact your healthcare provider if:    Your symptoms do not get better, or they get worse.    You have questions about the foods you should eat.    You have questions or concerns about your condition or care.

## 2021-11-18 NOTE — ED PROVIDER NOTE - PHYSICAL EXAMINATION
Gen: Patient is well-appearing, NAD, AAOx3, able to ambulate without assistance  HEENT: NCAT, normal conjunctiva, tongue midline, oral mucosa moist  Lung: CTAB, no respiratory distress, no wheezes/rhonchi/rales B/L, speaking in full sentences  CV: RRR, no murmurs, rubs or gallops, distal pulses 2+ b/l  Abd: soft, mild discomfort with palpation on suprapubic area and RLQ, ND, no guarding, no rigidity, no rebound tenderness, no CVA tenderness   MSK: no visible deformities, ROM normal in UE/LE  Neuro: No focal sensory or motor deficits  Skin: Warm, well perfused, no leg swelling  Psych: normal affect, calm

## 2021-11-18 NOTE — ED PROVIDER NOTE - CLINICAL SUMMARY MEDICAL DECISION MAKING FREE TEXT BOX
66 y F, hx of R breast cancer (s/p R breat surgery with no active treatment) no other PSH, presenting with 1-week onset of lower abdominal pain radiating to the RLQ. + diarrhea, pain with urination, BM. No fever, nausea, vomiting. /94, otherwise VSWNL. On exam, patient is NAD, abdomen is mildly tender at suprapubic area and RLQ. DDx include appendicitis vs kidney pathology vs less likely gyn pathologies. Will get labs, CTAP and reassess.

## 2021-11-18 NOTE — ED ADULT NURSE NOTE - OBJECTIVE STATEMENT
67 y/o female presents to ED from home c/o lower abdominal pain x 1 week. Pt states she feels sensation of pressure when she is urinating and having a BM. States pain is 3/10 when walking, 0/10 at rest. 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 Pt is A&O x 3. Breathing even and unlabored. Skin warm, dry, intact. Abdomen is soft, nondistended, nontender to palpation. Gross motor and neuro intact. 20G IV placed in RAC. Safety and comfort provided.

## 2021-11-18 NOTE — ED PROVIDER NOTE - OBJECTIVE STATEMENT
66 y F, hx of R breast cancer (s/p R breat surgery, no active treatment at the moment), GERD, fibromyalgia, scoliosis, no other PSH, presenting with 1-week onset of lower abdominal pain radiating to the RLQ. Pain is 7/10 when stand and walk. Reports diarrhea, pain with urinating and bowel movement. Denies fever, nausea, vomiting. 66 y F, hx of R breast cancer (s/p R breat surgery, no active treatment at the moment), GERD, fibromyalgia, scoliosis, no other PSH, presenting with 1-week onset of lower abdominal pain radiating to the RLQ. Pain is 7/10 when stand and walk. Reports diarrhea, pain with urinating and bowel movement. Denies fever, nausea, vomiting.    Attendinyo female presents with lower abdominal pain since Monday of this week.  Pain in the lower abdomen.  radiates to the RLQ.  worse with movement.  better at rest.  pain relieved after bowel movement.

## 2021-11-18 NOTE — ED PROVIDER NOTE - MDM ORDERS SUBMITTED SELECTION
Physical Therapy Daily Progress Note    Time In 4:00  Time Out 4:58    Yamilex Dugan reports: I can tell improvement - pain with running persists    Subjective     Objective     Palpation     Right Tenderness of the gluteus kenneth, gluteus medius and proximal biceps femoris.      See Exercise, Manual, and Modality Logs for complete treatment.       Assessment & Plan     Assessment  Assessment details: Increased muscle tone and tenderness persist proximal HS insertion. Tolerated gentle strengthening initiation well. Cont PT 1-2x per week for flexibility and strengthening - next visit attempt resisted knee flex and ext if tolerated - may discuss Dry Needling.         Progress strengthening /stabilization /functional activity           Manual Therapy:    10     mins  38985;  Therapeutic Exercise:    22     mins  [id number];     Neuromuscular Karla:        mins  56836;    Therapeutic Activity:          mins  33610;     Gait Training:           mins  97286;     Ultrasound:     8     mins  31786;    Electrical Stimulation:    15     mins  16740 ( );  Dry Needling          mins self-pay    Timed Treatment:   40   mins   Total Treatment:     55   mins    Cassy Wilde, PT  Physical Therapist  [place] License # [id number]  
Labs/Imaging Studies

## 2021-11-20 LAB
CULTURE RESULTS: SIGNIFICANT CHANGE UP
SPECIMEN SOURCE: SIGNIFICANT CHANGE UP

## 2021-12-01 PROBLEM — C50.919 MALIGNANT NEOPLASM OF UNSPECIFIED SITE OF UNSPECIFIED FEMALE BREAST: Chronic | Status: ACTIVE | Noted: 2021-11-18

## 2021-12-03 ENCOUNTER — APPOINTMENT (OUTPATIENT)
Dept: PULMONOLOGY | Facility: CLINIC | Age: 66
End: 2021-12-03

## 2021-12-20 ENCOUNTER — OUTPATIENT (OUTPATIENT)
Dept: OUTPATIENT SERVICES | Facility: HOSPITAL | Age: 66
LOS: 1 days | Discharge: HOME | End: 2021-12-20

## 2021-12-20 DIAGNOSIS — Z98.890 OTHER SPECIFIED POSTPROCEDURAL STATES: Chronic | ICD-10-CM

## 2021-12-20 DIAGNOSIS — C50.919 MALIGNANT NEOPLASM OF UNSPECIFIED SITE OF UNSPECIFIED FEMALE BREAST: ICD-10-CM

## 2021-12-21 ENCOUNTER — APPOINTMENT (OUTPATIENT)
Dept: HEMATOLOGY ONCOLOGY | Facility: CLINIC | Age: 66
End: 2021-12-21
Payer: MEDICARE

## 2021-12-21 ENCOUNTER — NON-APPOINTMENT (OUTPATIENT)
Age: 66
End: 2021-12-21

## 2021-12-21 VITALS
HEIGHT: 64.02 IN | WEIGHT: 194.67 LBS | DIASTOLIC BLOOD PRESSURE: 78 MMHG | HEART RATE: 72 BPM | SYSTOLIC BLOOD PRESSURE: 119 MMHG | OXYGEN SATURATION: 97 % | RESPIRATION RATE: 16 BRPM | TEMPERATURE: 98.1 F | BODY MASS INDEX: 33.23 KG/M2

## 2021-12-21 DIAGNOSIS — Z87.19 PERSONAL HISTORY OF OTHER DISEASES OF THE DIGESTIVE SYSTEM: ICD-10-CM

## 2021-12-21 PROCEDURE — 99205 OFFICE O/P NEW HI 60 MIN: CPT

## 2021-12-21 NOTE — PHYSICAL EXAM
[Fully active, able to carry on all pre-disease performance without restriction] : Status 0 - Fully active, able to carry on all pre-disease performance without restriction [Normal] : affect appropriate [de-identified] : s/p right lumpectomy surgical scar well healed; right breast smaller than left breast; right breat 4 mm horizontal erythemaous rash w/ the appearance of dried vessicles; also noted on back along same dermatone UA positive, CT abd with findings of numerous wedge shaped densities indicating infection vs. infarct  - Started on Zosyn q8hr  - Follow up urine and blood cultures.  - Follow up urology regarding wedge shaped densities in right kidney

## 2021-12-21 NOTE — END OF VISIT
[Time Spent: ___ minutes] : I have spent [unfilled] minutes of time on the encounter. [FreeTextEntry3] : Dr. Campuzano was present for this visit and advised on primary plan of care for this patient.\par

## 2021-12-21 NOTE — HISTORY OF PRESENT ILLNESS
[de-identified] : 66 year old female w/ history of right breast cancer transferring care from [facility] to [facility].\par \par [place] initially presented in 2007 when at the age of 51 a routine mammo/sono disclosed a new mass at the 10:00 position of the right breast.\par \par A core biopsy on 4/11/2007 disclosed atypical proliferation for which a incisional biopsy was recommended\par \par 4/23/2007: Dr. Pily Ramon completed excision of right breast mass which disclosed a 5 cm tumor with infiltrating ductal carcinoma measuring 2.5 cm and DCIS measuring 2.5 cm.  Tumor involved all margins; \par \par 5/15/2007:  Partial Mastectomy:  Patient subsequently underwent re-excision of margins\par Slides were reviewed at [facility]\par ER+/ND+;  Her 2 negative. \par Final Staging: Stage IIA (T2N0M0). \par \par Oncotype: 10 reflecting 7% risk of recurrence\par \par Adjuvant Treatment:\par No chemotherapy given\par Adjuvant Endocrine Therapy: initially Lupron/Tamoxifen --> Arimidex - completed 10 years of endocrine therapy - completed in 2017\par \par Patient currently follows w/ Dr. Colon\par Radiation Therapy @ [facility] from 7/2007 - 9/2007\par \par  [de-identified] : 12/21/2021\par Patient returns today for followup for history of breast cancer and to rule out metastatic breast cancer.\par Patient denies any SOB, CP, abdominal pain, bone pain or headache.\par Patient denies any breast masses, breast tenderness, skin changes or nipple discharge.\par c/o of hotflashes/night sweats - ongoing - uses Effexor and Neurontin for management; if she misses a day she notes intense hot flashes recur.  \par also patient can only take BRAND effexor and Neurontin b/c she is allergic to the fillers in the generic - whole body rash\par \par HEALTH MAINTENANCE:\par GENETICS: APC low penetrance variant (increased risk of CRC) \par MAMMO/SONO: 10/2021 - reviewed report from [facility]\par BREAST MRI: 2019 - no longer getting annually - was concerned about gadolineum - asking if she should resume \par BONE DENSITY: Dr Malika Escobar follows her bone density and is scheduled for updated bw \par GI/COLONOSCOPY:  Dr. Ro - scheduled for 2/2022\par GYN EXAM: Dr. Cuate Ortiz 2021 - annual paps\par PCP EXAM: No longer has PCP that she follows with \par DERMATOLOGY EXAM:  Dr. Ang - sees prn \par BREAST SURGEON: Dr. Colon - sees annually January 2022\par \par

## 2021-12-21 NOTE — ASSESSMENT
[FreeTextEntry1] : 2007: Stage IIa right breast cancer; s/p lumpectomy, RT with Oncotype of 10; completed 10 years of endocrine therapy in 2017 (tamoxifen/Lupron followed by [facility]).  \par \par - LYNDA x 13 years\par - continue survivorship followup annually - did also discuss potential for survivorship program at [facility] for long term management\par - will see patient again in June 2023 - she sees Dr. Colon in January so will space out visits.\par - mammo/sono 10/2022 due\par - advised to discuss need for MRI Breast w/ Dr. Colon based on current guidelines/recommendations\par \par Hot Flashes\par - okay to continue Effexor XR 75 mg daily and Gabapentin 900 mg tid\par \par APC low penetrance variant-\par - continue f/up w/ Dr. Ro GI for colonoscpy screening and f/up diverticulitis dx.\par - colonoscopy scheduled for 2/2022\par \par Obesity\par - encouraged ongoing weight loss/exercise to optimize BMI\par \par PMD at least annually with lipid checks/bloodwork, gyn at least annually and PAP test screening per their recommendations, colon cancer screening/colonoscopy at least every 10 years as recommended by PMD/GI , dermatology for annual skin checks  ophthalmology for annual eye exams\par \par Patient to have updated bw sent to us for review.\par Patient had an opportunity to have her questions asked and answered to her satisfaction.\par

## 2021-12-21 NOTE — REVIEW OF SYSTEMS
[Wheezing] : wheezing [Negative] : Allergic/Immunologic [FreeTextEntry6] : h/o asthma [FreeTextEntry7] : reflux - controlled [FreeTextEntry1] : h/o urticaria on Xolair

## 2022-03-25 ENCOUNTER — NON-APPOINTMENT (OUTPATIENT)
Age: 67
End: 2022-03-25

## 2022-03-25 ENCOUNTER — APPOINTMENT (OUTPATIENT)
Dept: PULMONOLOGY | Facility: CLINIC | Age: 67
End: 2022-03-25
Payer: MEDICARE

## 2022-03-25 VITALS
HEIGHT: 64 IN | OXYGEN SATURATION: 95 % | WEIGHT: 195 LBS | HEART RATE: 85 BPM | TEMPERATURE: 97.2 F | BODY MASS INDEX: 33.29 KG/M2 | SYSTOLIC BLOOD PRESSURE: 136 MMHG | RESPIRATION RATE: 16 BRPM | DIASTOLIC BLOOD PRESSURE: 80 MMHG

## 2022-03-25 PROCEDURE — 95012 NITRIC OXIDE EXP GAS DETER: CPT

## 2022-03-25 PROCEDURE — 99214 OFFICE O/P EST MOD 30 MIN: CPT | Mod: 25

## 2022-03-25 PROCEDURE — 94010 BREATHING CAPACITY TEST: CPT

## 2022-03-25 NOTE — ADDENDUM
[FreeTextEntry1] : Documented by Frandy Chen acting as a scribe for Dr. Korey Bautista on 03/25/2022\par \par All medical record entries made by the scribe were at my, Dr. Korey Bautista's, direction and personally dictated by me on 03/25/2022. I have reviewed the chart and agree that the record accurately reflects my personal performance of the history, physical exam, assessment, and plan. I have also personally directed, reviewed, and agree with the discharge instructions.

## 2022-03-25 NOTE — HISTORY OF PRESENT ILLNESS
[FreeTextEntry1] : Ms. Smith is a 66 year old female with a history of allergic rhinitis, asthma, GERD, obesity, low vitamin D, KRISTA, eosinophilic asthma, and cough presenting to the office today for a follow up visit. Her chief complaint is \par -she notes a recent bout of diverticulitis\par -s/p colonoscopy\par -she notes she never really recovered from the diverticulitis and she has a pancreatic enzymatic deficiency and was put on Creon\par -she notes her energy level is sufficient\par -she notes some diarrhea 1-3x per day\par -she notes wheezing sometimes\par -she notes doing fine allergy wise so far\par -s/p COVID-19 vaccine x3 \par -she notes the first two vaccines were fine but the 3rd one resulted in a 103 fever and aches but this resolved within 24 hours\par -she notes she stays away from gluten \par \par \par -patient denies any headaches, nausea, vomiting, fever, chills, sweats, chest pain, chest pressure, palpitations, coughing, wheezing, fatigue, diarrhea, constipation, dysphagia, myalgias, dizziness, leg swelling, leg pain, itchy eyes, itchy ears, heartburn, reflux or sour taste in the mouth

## 2022-03-25 NOTE — ASSESSMENT
[FreeTextEntry1] : Ms. Smith is 66 year old Female who has a history of allergic (IgE mediated) and eosinophilic asthma, allergies, OW, urticaria, GERD, and OSAS.  She is currently stable from a pulmonary perspective except for weight management - pancreatic insufficiency\par \par problem 1: moderate persistent asthma - stable \par -continue Levalbuterol via Hand held nebulizer, Q6H\par -continue Advair  at 2 inhalations BID\par -continue to use Spiriva 1 inhalation QD\par -continue Qvar Redihaler 80 2 puffs BID \par -continue to use Singulair 10 mg before bed \par -rescue inhaler PRN \par \par Asthma is believed to be caused by inherited (genetic) and environmental factor, but its exact cause is unknown. Asthma may be triggered by allergens, lung infections, or irritants in the air. Asthma triggers are different for each person \par -Inhaler technique reviewed as well as oral hygiene techniques reviewed with patient. Avoidance of cold air, extremes of temperature, rescue inhaler should be used before exercise. Order of medication reviewed with patient. Recommended use of a cool mist humidifier in the bedroom. \par \par problem 2: eosinophilic asthma (stable)\par -Candidate for Nucala / Fasenra\par -The safety and efficacy of Nucala was established in three double-blind, randomized, placebo-controlled trials in patients with severe asthma. Compared to a placebo, patients with severe asthma receiving Nucala had fewer exacerbation requiring hospitalization and/or emergency department visits, and a longer time to first exacerbation. In addition, patients with severe asthma receiving Nucala or Fasenra experienced greater reductions in their daily maintenance oral corticosteroid dose, while maintaining asthma control compared with patients receiving placebo. Treatment with Nucala did not result in a significant improvement in lung function, as measured by the volume of air exhaled by patients in one second. The most common side effects include: headache, injection site reactions, back pain, weakness, and fatigue; hypersensitivity reactions can occur within hours or days including swelling of the face, mouth, and tongue, fainting, dizziness, hives, breathing problems, and rash; herpes zoster infections have occurred. The drug is a monoclonal antibody that inhibits interleukin -5 which helps regular eosinophils, a type of white blood cell that contributes to asthma. The over-production of eosinophils can cause inflammation in the lungs, increasing the frequency of asthma attacks. Patients must also take other medications, including high dose inhaled corticosteroids and at least one additional asthma drug\par \par problem 3: allergic (IgE mediated) asthma\par -continue to use Xolair 225 q monthly - attempting weaning. (q 7 weeks)\par -Xolair is a recombinant DNA- derived humanized IgG1K monoclonal antibody that selectively binds ot human immunoglobulin E (IgE). Xolair is produced by a Chinese hamster ovary cell suspension culture in nutrient medium containing the antibiotic gentamicin. Gentamicin is not detectable in the final product. Xolair is a sterile, white, preservative free, lyophilized powder contained in a single use vial that is reconstituted with sterile water for suspension. Side effects include: wheezing, tightness of the chest, trouble breathing, hives, skin rash, feeling anxious or light-headed, fainting, warmth or tingling under skin, or swelling of face, lips, or tongue \par \par problem 4: GERD / pancreatic insufficiency (3/2022)\par -continue Pepcid 40mg QHS \par -continue generic Nexium 40 mg before breakfast\par -Continue Creon\par -Rule of 2s: avoid eating too much, eating too late, eating too spicy, eating two hours before bed\par -Things to avoid including overeating, spicy foods, tight clothing, eating within three hours of bed, this list is not all inclusive. \par -For treatment of reflux, possible options discussed including diet control, H2 blockers, PPIs, as well as coating motility agents discussed as treatment options. Timing of meals and proximity of last meal to sleep were discussed. If symptoms persist, a formal gastrointestinal evaluation is needed. \par \par problem 5: allergic rhinitis\par -Olopatadine 0.6% 1 sniff each nostril BID \par -continue to use Veramyst 1 sniff each nostril BID\par -continue Xyzal 5 mg before bed \par \par -Environmental measures for allergies were encouraged including mattress and pillow cover, air purifier, and environmental controls. \par \par problem 6: KRISTA\par -continue to use CPAP machine - tolerating it well and seeing benefits\par -recommended to use Insomnitol prn\par \par -Sleep apnea is associated with adverse clinical consequences which an affect most organ systems. Cardiovascular disease risk includes arrhythmias, atrial fibrillation, hypertension, coronary artery disease, and stroke. Metabolic disorders include diabetes type 2, non-alcoholic fatty liver disease. Mood disorder especially depression; and cognitive decline especially in the elderly. Associations with chronic reflux/Dennis’s esophagus some but not all inclusive. \par -Reasons to assess this include arousal consistent with hypopnea; respiratory events most prominent in REM sleep or supine position; therefore sleep staging and body position are important for accurate diagnosis and estimation of AHI. \par \par problem 7: leg pain (improved) / back pain\par -Recommend seeing Dio Duenas (chiropractor)\par -recommended to continue f/u with Dr. Cheyanne Yousif\par -recommended continue to use L-carnitine\par -being added CoQ-10 at 200 mg\par -being added vitamin D 50,000 units once a month\par \par problem 8: obesity\par -Recommend "Muniq" OTC for weight loss, energy, and blood sugar \par -Recommend she see Dr. Paniagua/ [place] \par -Weight loss, exercise, and diet control were discussed and are highly encouraged. Treatment options were given such as, aqua therapy, and contacting a nutritionist. Recommended to use the elliptical, stationary bike, less use of treadmill. Obesity is associated with worsening asthma, shortness of breath, and potential for cardiac disease, diabetes, and other underlying medical conditions. \par -recommended to consider yoga\par \par Problem 8A: Poor mechanics of breathing\par -Recommended Wim Hof and Buteyko breathing techniques \par - Proper breathing techniques were reviewed with an emphasis on exhalation. Patient instructed to breath in for 1 second and out for four seconds. Patient was encouraged not to talk while walking. \par \par problem 9: idiopathic urticaria\par - It is recommended that she hold all of her supplements and then restart them one at a time. \par -continue to use Xolair 225 q monthly - attempting taper to off. \par \par problem 10: fatigue\par -recommended to go gluten free\par -recommended to look at number of hours she is sleeping as well as her CPAP pressures \par \par problem 11: health maintenance \par -Recommend seeing Dio Duenas for back pain\par - recommended SPM \par - s/pCOVID-19 vaccines (Moderna) x3\par -s/p flu shot - 2021\par -recommended strep pneumonia vaccines: Prevnar-13 vaccine, followed by Pneumo vaccine 23 one year following\par -recommended early intervention for URIs\par -recommended regular osteoporosis evaluations\par -recommended early dermatological evaluations\par -recommended after the age of 50 to the age of 70, colonoscopy every 5 years \par  \par \par F/U in 4 months - SPI / NIOX\par She is encouraged to call with any changes, concerns, or questions.

## 2022-03-25 NOTE — PROCEDURE
[FreeTextEntry1] : FENO was 30; a normal value being less than 25. Fractional exhaled nitric oxide (FENO) is regarded as a simple, noninvasive method for assessing eosinophilic airway inflammation. Produced by a variety of cells within the lung, nitric oxide (NO) concentrations are generally low in healthy individuals. However, high concentrations of NO appear to be involved in nonspecific host defense mechanisms and chronic inflammatory  diseases such as asthma. The American Thoracic Society (ATS) therefore recommended using FENO to aid in the diagnosis and monitoring of eosinophilic airway inflammation and asthma, and for identifying steroid responsive individuals whose chronic respiratory symptoms may be caused by airway inflammation \par \par PFT revealed normal flows, with a FEV1 of 2.18L, which is 91% of predicted, with a normal flow volume loop

## 2022-04-12 ENCOUNTER — RX RENEWAL (OUTPATIENT)
Age: 67
End: 2022-04-12

## 2022-04-13 ENCOUNTER — APPOINTMENT (OUTPATIENT)
Dept: SURGERY | Facility: CLINIC | Age: 67
End: 2022-04-13
Payer: MEDICARE

## 2022-04-13 PROCEDURE — 99213K: CUSTOM

## 2022-07-06 ENCOUNTER — RX RENEWAL (OUTPATIENT)
Age: 67
End: 2022-07-06

## 2022-08-12 ENCOUNTER — APPOINTMENT (OUTPATIENT)
Dept: NEPHROLOGY | Facility: CLINIC | Age: 67
End: 2022-08-12

## 2022-08-12 VITALS
DIASTOLIC BLOOD PRESSURE: 80 MMHG | BODY MASS INDEX: 33.29 KG/M2 | HEIGHT: 64 IN | HEART RATE: 78 BPM | SYSTOLIC BLOOD PRESSURE: 144 MMHG | WEIGHT: 195 LBS

## 2022-08-12 DIAGNOSIS — Z80.51 FAMILY HISTORY OF MALIGNANT NEOPLASM OF KIDNEY: ICD-10-CM

## 2022-08-12 PROCEDURE — 99205 OFFICE O/P NEW HI 60 MIN: CPT

## 2022-08-12 RX ORDER — DULAGLUTIDE 0.75 MG/.5ML
0.75 INJECTION, SOLUTION SUBCUTANEOUS
Qty: 4 | Refills: 0 | Status: DISCONTINUED | COMMUNITY
Start: 2020-11-11 | End: 2022-08-12

## 2022-08-12 RX ORDER — RAMIPRIL 2.5 MG/1
2.5 CAPSULE ORAL
Qty: 90 | Refills: 0 | Status: DISCONTINUED | COMMUNITY
Start: 2022-06-30 | End: 2022-08-12

## 2022-08-12 RX ORDER — PANCRELIPASE 120000; 24000; 76000 [USP'U]/1; [USP'U]/1; [USP'U]/1
24000-76000 CAPSULE, DELAYED RELEASE PELLETS ORAL
Qty: 360 | Refills: 0 | Status: ACTIVE | COMMUNITY
Start: 2022-07-27

## 2022-08-13 NOTE — ASSESSMENT
[FreeTextEntry1] : 65 yo female with obesity fibromyalgia with albuminuria. 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 \par She is on ramipril but appears she may have a cough so will see if the ARB has a better profile and take her off ACE. \par Elevated BP: her BP is elevated in office. Will try the telmisartan at low dose. \par Wt loss advised and she will continue to work with endo. \par Avoid chronic NSAID use\par Trend alb:cr ratio\par Followup in 6 months televisit ok then.

## 2022-08-13 NOTE — REVIEW OF SYSTEMS
[Cough] : cough [As Noted in HPI] : as noted in HPI [Negative] : Heme/Lymph [FreeTextEntry9] : fibromyalgia

## 2022-08-13 NOTE — PHYSICAL EXAM
[General Appearance - Alert] : alert [General Appearance - In No Acute Distress] : in no acute distress [General Appearance - Well Nourished] : well nourished [Sclera] : the sclera and conjunctiva were normal [Outer Ear] : the ears and nose were normal in appearance [Neck Appearance] : the appearance of the neck was normal [Auscultation Breath Sounds / Voice Sounds] : lungs were clear to auscultation bilaterally [Heart Rate And Rhythm] : heart rate was normal and rhythm regular [Heart Sounds] : normal S1 and S2 [Murmurs] : no murmurs [Heart Sounds Pericardial Friction Rub] : no pericardial rub [Edema] : there was no peripheral edema [Bowel Sounds] : normal bowel sounds [Abdomen Soft] : soft [Involuntary Movements] : no involuntary movements were seen [] : no rash [No Focal Deficits] : no focal deficits [Oriented To Time, Place, And Person] : oriented to person, place, and time [Affect] : the affect was normal

## 2022-08-13 NOTE — CONSULT LETTER
[Dear  ___] : Dear  [unfilled], [Consult Letter:] : I had the pleasure of evaluating your patient, [unfilled]. [( Thank you for referring [unfilled] for consultation for _____ )] : Thank you for referring [unfilled] for consultation for [unfilled] [Please see my note below.] : Please see my note below. [Consult Closing:] : Thank you very much for allowing me to participate in the care of this patient.  If you have any questions, please do not hesitate to contact me. [Sincerely,] : Sincerely, [FreeTextEntry3] : Faith Hernandez MD\par  [facility]\par Division of Nephrology and Hypertension\par \par

## 2022-08-13 NOTE — HISTORY OF PRESENT ILLNESS
[FreeTextEntry1] : 65 yo female with history of obesity, asthma, fibromyalgia, here for evaluation of proteinuria. \par She is following endo Dr EFRAIN Escobar for wt management and on metformin and trulicity.\par She has noted protein in her urine for the last year and she is concerned. \par She was placed on ramipril 2.5mg within the past year. She noticed dry cough more recently. \par She has a hard time losing the wt. Unable to lose the wt unable to exercise bc of fibromyalgia,\par She does not consume a high animal protein diet. \par Denies chronic NSAIDs\par She has a history of diverticulitis Nov 2021 and had a CT scan in past year which was unremarkable. \par Her mother had ESRD from atrophic kidneys so she is concerned about  her own kidney health.

## 2022-08-17 ENCOUNTER — NON-APPOINTMENT (OUTPATIENT)
Age: 67
End: 2022-08-17

## 2022-08-19 RX ORDER — TELMISARTAN 20 MG/1
20 TABLET ORAL DAILY
Qty: 90 | Refills: 3 | Status: DISCONTINUED | COMMUNITY
Start: 2022-08-12 | End: 2022-08-19

## 2022-09-22 ENCOUNTER — RX RENEWAL (OUTPATIENT)
Age: 67
End: 2022-09-22

## 2022-10-03 ENCOUNTER — RX RENEWAL (OUTPATIENT)
Age: 67
End: 2022-10-03

## 2022-10-17 ENCOUNTER — APPOINTMENT (OUTPATIENT)
Dept: MAMMOGRAPHY | Facility: CLINIC | Age: 67
End: 2022-10-17

## 2022-10-17 ENCOUNTER — APPOINTMENT (OUTPATIENT)
Dept: ULTRASOUND IMAGING | Facility: CLINIC | Age: 67
End: 2022-10-17

## 2022-10-17 ENCOUNTER — OUTPATIENT (OUTPATIENT)
Dept: OUTPATIENT SERVICES | Facility: HOSPITAL | Age: 67
LOS: 1 days | End: 2022-10-17
Payer: MEDICARE

## 2022-10-17 DIAGNOSIS — Z98.890 OTHER SPECIFIED POSTPROCEDURAL STATES: Chronic | ICD-10-CM

## 2022-10-17 DIAGNOSIS — Z00.8 ENCOUNTER FOR OTHER GENERAL EXAMINATION: ICD-10-CM

## 2022-10-17 PROCEDURE — 77063 BREAST TOMOSYNTHESIS BI: CPT | Mod: 26

## 2022-10-17 PROCEDURE — 77067 SCR MAMMO BI INCL CAD: CPT

## 2022-10-17 PROCEDURE — 77067 SCR MAMMO BI INCL CAD: CPT | Mod: 26

## 2022-10-17 PROCEDURE — 76641 ULTRASOUND BREAST COMPLETE: CPT | Mod: 26,50

## 2022-10-17 PROCEDURE — 77063 BREAST TOMOSYNTHESIS BI: CPT

## 2022-10-17 PROCEDURE — 76641 ULTRASOUND BREAST COMPLETE: CPT

## 2022-11-04 ENCOUNTER — APPOINTMENT (OUTPATIENT)
Dept: PULMONOLOGY | Facility: CLINIC | Age: 67
End: 2022-11-04

## 2022-11-04 ENCOUNTER — NON-APPOINTMENT (OUTPATIENT)
Age: 67
End: 2022-11-04

## 2022-11-04 VITALS
SYSTOLIC BLOOD PRESSURE: 130 MMHG | WEIGHT: 192 LBS | OXYGEN SATURATION: 98 % | BODY MASS INDEX: 32.78 KG/M2 | HEART RATE: 75 BPM | TEMPERATURE: 97.4 F | RESPIRATION RATE: 16 BRPM | DIASTOLIC BLOOD PRESSURE: 70 MMHG | HEIGHT: 64 IN

## 2022-11-04 PROCEDURE — 99214 OFFICE O/P EST MOD 30 MIN: CPT | Mod: 25

## 2022-11-04 PROCEDURE — 94010 BREATHING CAPACITY TEST: CPT

## 2022-11-04 PROCEDURE — 95012 NITRIC OXIDE EXP GAS DETER: CPT

## 2022-11-04 RX ORDER — NYSTATIN 100000 [USP'U]/ML
100000 SUSPENSION ORAL
Qty: 473 | Refills: 2 | Status: ACTIVE | COMMUNITY
Start: 2022-11-04 | End: 1900-01-01

## 2022-11-04 RX ORDER — BENZONATATE 200 MG/1
200 CAPSULE ORAL 3 TIMES DAILY
Qty: 90 | Refills: 5 | Status: ACTIVE | COMMUNITY
Start: 2022-11-04 | End: 1900-01-01

## 2022-11-04 RX ORDER — FAMOTIDINE 20 MG/1
20 TABLET, FILM COATED ORAL
Qty: 90 | Refills: 0 | Status: ACTIVE | COMMUNITY
Start: 2022-08-23

## 2022-11-04 RX ORDER — PREDNISONE 20 MG/1
20 TABLET ORAL
Qty: 12 | Refills: 0 | Status: DISCONTINUED | COMMUNITY
Start: 2022-10-06

## 2022-11-04 RX ORDER — GLIMEPIRIDE 1 MG/1
1 TABLET ORAL
Qty: 90 | Refills: 0 | Status: ACTIVE | COMMUNITY
Start: 2022-10-24

## 2022-11-04 RX ORDER — TIRZEPATIDE 5 MG/.5ML
5 INJECTION, SOLUTION SUBCUTANEOUS
Qty: 2 | Refills: 0 | Status: ACTIVE | COMMUNITY
Start: 2022-10-24

## 2022-11-04 NOTE — PROCEDURE
[FreeTextEntry1] : PFT reveals normal flows, with an FEV1 of  1.92L, which is 81% of predicted, with a normal flow volume loop \par \par FENO was 65; a normal value being less than 25\par Fractional exhaled nitric oxide (FENO) is regarded as a simple, noninvasive method for assessing eosinophilic airway inflammation. Produced by a variety of cells within the lung, nitric oxide (NO) concentrations are generally low in healthy individuals. However, high concentrations of NO appear to be involved in nonspecific host defense mechanisms and chronic inflammatory diseases such as asthma. The American Thoracic Society (ATS) therefore has recommended using FENO to aid in the diagnosis and monitoring of eosinophilic airway inflammation and asthma, and for identifying steroid responsive individuals whose chronic respiratory symptoms may be caused by airway inflammation.

## 2022-11-04 NOTE — ADDENDUM
[FreeTextEntry1] : Documented by Agus Merino acting as a scribe for Dr. Korey Bautista on (11/04/2022).\par \par All medical record entries made by the Scribe were at my, Dr. Korey Bautista's, direction and personally dictated by me on (11/04/2022). I have reviewed the chart and agree that the record accurately reflects my personal performance of the history, physical exam, assessment and plan. I have personally directed, reviewed, and agree with the discharge instructions.

## 2022-11-04 NOTE — ASSESSMENT
[FreeTextEntry1] : Ms. Smith is 67 year old Female who has a history of allergic (IgE mediated) and eosinophilic asthma, allergies, OW, urticaria, GERD, and OSAS.  She is currently stable from a pulmonary perspective except for weight management - pancreatic insufficiency (still)\par \par problem 1: moderate persistent asthma - stable \par -continue Levalbuterol via Hand held nebulizer, Q6H\par -continue Advair  at 2 inhalations BID\par -continue to use Spiriva 1 inhalation QD\par -continue Qvar Redihaler 80 2 puffs BID \par -continue to use Singulair 10 mg before bed \par -rescue inhaler PRN \par \par Asthma is believed to be caused by inherited (genetic) and environmental factor, but its exact cause is unknown. Asthma may be triggered by allergens, lung infections, or irritants in the air. Asthma triggers are different for each person \par -Inhaler technique reviewed as well as oral hygiene techniques reviewed with patient. Avoidance of cold air, extremes of temperature, rescue inhaler should be used before exercise. Order of medication reviewed with patient. Recommended use of a cool mist humidifier in the bedroom. \par \par problem 2: eosinophilic asthma (stable)\par -Candidate for Nucala / Fasenra\par -The safety and efficacy of Nucala was established in three double-blind, randomized, placebo-controlled trials in patients with severe asthma. Compared to a placebo, patients with severe asthma receiving Nucala had fewer exacerbation requiring hospitalization and/or emergency department visits, and a longer time to first exacerbation. In addition, patients with severe asthma receiving Nucala or Fasenra experienced greater reductions in their daily maintenance oral corticosteroid dose, while maintaining asthma control compared with patients receiving placebo. Treatment with Nucala did not result in a significant improvement in lung function, as measured by the volume of air exhaled by patients in one second. The most common side effects include: headache, injection site reactions, back pain, weakness, and fatigue; hypersensitivity reactions can occur within hours or days including swelling of the face, mouth, and tongue, fainting, dizziness, hives, breathing problems, and rash; herpes zoster infections have occurred. The drug is a monoclonal antibody that inhibits interleukin -5 which helps regular eosinophils, a type of white blood cell that contributes to asthma. The over-production of eosinophils can cause inflammation in the lungs, increasing the frequency of asthma attacks. Patients must also take other medications, including high dose inhaled corticosteroids and at least one additional asthma drug\par \par problem 3: allergic (IgE mediated) asthma\par -continue to use Xolair 225 q monthly - attempting weaning. (q 7 weeks)\par -Xolair is a recombinant DNA- derived humanized IgG1K monoclonal antibody that selectively binds ot human immunoglobulin E (IgE). Xolair is produced by a Chinese hamster ovary cell suspension culture in nutrient medium containing the antibiotic gentamicin. Gentamicin is not detectable in the final product. Xolair is a sterile, white, preservative free, lyophilized powder contained in a single use vial that is reconstituted with sterile water for suspension. Side effects include: wheezing, tightness of the chest, trouble breathing, hives, skin rash, feeling anxious or light-headed, fainting, warmth or tingling under skin, or swelling of face, lips, or tongue \par \par problem 4: GERD / pancreatic insufficiency (3/2022)\par -continue Pepcid 40mg QHS \par -continue generic Nexium 40 mg before breakfast\par -Continue Creon \par -Rule of 2s: avoid eating too much, eating too late, eating too spicy, eating two hours before bed\par -Things to avoid including overeating, spicy foods, tight clothing, eating within three hours of bed, this list is not all inclusive. \par -For treatment of reflux, possible options discussed including diet control, H2 blockers, PPIs, as well as coating motility agents discussed as treatment options. Timing of meals and proximity of last meal to sleep were discussed. If symptoms persist, a formal gastrointestinal evaluation is needed. \par \par problem 5: allergic rhinitis\par -Olopatadine 0.6% 1 sniff each nostril BID \par -continue to use Veramyst 1 sniff each nostril BID\par -continue Xyzal 5 mg before bed \par \par -Environmental measures for allergies were encouraged including mattress and pillow cover, air purifier, and environmental controls. \par \par problem 6: KRISTA\par -continue to use CPAP machine - tolerating it well and seeing benefits\par -recommended to use Insomnitol prn\par \par -Sleep apnea is associated with adverse clinical consequences which an affect most organ systems. Cardiovascular disease risk includes arrhythmias, atrial fibrillation, hypertension, coronary artery disease, and stroke. Metabolic disorders include diabetes type 2, non-alcoholic fatty liver disease. Mood disorder especially depression; and cognitive decline especially in the elderly. Associations with chronic reflux/Dennis’s esophagus some but not all inclusive. \par -Reasons to assess this include arousal consistent with hypopnea; respiratory events most prominent in REM sleep or supine position; therefore sleep staging and body position are important for accurate diagnosis and estimation of AHI. \par \par problem 7: leg pain (improved) / back pain\par -Recommend seeing Dio Duenas (chiropractor)\par -recommended to continue f/u with Dr. Cheyanne Yousif\par -recommended continue to use L-carnitine\par -being added CoQ-10 at 200 mg\par -being added vitamin D 50,000 units once a month\par \par problem 8: obesity\par -Recommend "Muniq" OTC for weight loss, energy, and blood sugar / Tyson Corina et al\par -Recommend she see Dr. Paniagua/ [place] \par -Weight loss, exercise, and diet control were discussed and are highly encouraged. Treatment options were given such as, aqua therapy, and contacting a nutritionist. Recommended to use the elliptical, stationary bike, less use of treadmill. Obesity is associated with worsening asthma, shortness of breath, and potential for cardiac disease, diabetes, and other underlying medical conditions. \par -recommended to consider yoga\par \par Problem 8A: Poor mechanics of breathing\par -Recommended Wim Hof and Buteyko breathing techniques \par - Proper breathing techniques were reviewed with an emphasis on exhalation. Patient instructed to breath in for 1 second and out for four seconds. Patient was encouraged not to talk while walking. \par \par problem 9: idiopathic urticaria\par - It is recommended that she hold all of her supplements and then restart them one at a time. \par -continue to use Xolair 225 q monthly - attempting taper to off. \par \par problem 10: fatigue\par -recommended to go gluten free\par -recommended to look at number of hours she is sleeping as well as her CPAP pressures \par \par problem 11: health maintenance \par - Recommended SaNotize \par -Recommend seeing Dio Duenas for back pain\par - recommended SPM \par - s/pCOVID-19 vaccines (Moderna) x3\par -s/p flu shot - 2021\par -recommended strep pneumonia vaccines: Prevnar-13 vaccine, followed by Pneumo vaccine 23 one year following\par -recommended early intervention for URIs\par -recommended regular osteoporosis evaluations\par -recommended early dermatological evaluations\par -recommended after the age of 50 to the age of 70, colonoscopy every 5 years \par  \par \par F/U in 4 months - SPI / NIOX\par She is encouraged to call with any changes, concerns, or questions.

## 2022-11-04 NOTE — HISTORY OF PRESENT ILLNESS
[FreeTextEntry1] : Ms. Smith is a 67 year old female with a history of allergic rhinitis, asthma, GERD, obesity, low vitamin D, KRISTA, eosinophilic asthma, and cough presenting to the office today for a follow up visit. Her chief complaint is \par - URI about three weeks ago which triggered her asthma\par - she notes feeling well now \par - she notes heartburn\par - she denies any visual issues\par - she denies hoarseness\par - she notes weight is stable \par - she notes getting enough sleep, 8-9 hours \par \par - She denies any headaches, nausea, vomiting, fever, chills, sweats, chest pain, chest pressure, palpitations, SOB, coughing, wheezing, fatigue, diarrhea, constipation, dysphagia, myalgias, dizziness, leg swelling, leg pain, itchy eyes, itchy ears, heartburn, reflux, or sour taste in the mouth

## 2022-11-17 NOTE — ED ADULT NURSE NOTE - CINV DISCH MEDS REVIEWED YN
Condition:: skin lesion
Please Describe Your Condition:: new scaly tender papules on arms and hands on sun exposed areas for years. Chaz Nieves has had successful treatments with LN2 and PDT in the past and would like evaluation and treatment today.
augmentin/Yes

## 2023-01-05 ENCOUNTER — APPOINTMENT (OUTPATIENT)
Dept: PULMONOLOGY | Facility: CLINIC | Age: 68
End: 2023-01-05
Payer: MEDICARE

## 2023-01-05 VITALS — BODY MASS INDEX: 32.44 KG/M2 | WEIGHT: 190 LBS | HEIGHT: 64 IN

## 2023-01-05 DIAGNOSIS — L50.8 OTHER URTICARIA: ICD-10-CM

## 2023-01-05 PROCEDURE — 99214 OFFICE O/P EST MOD 30 MIN: CPT | Mod: 95

## 2023-01-05 NOTE — REASON FOR VISIT
[Follow-Up] : a follow-up visit [FreeTextEntry1] : video call-- allergic rhinitis, asthma, GERD, obesity, low vitamin D, KRISTA, eosinophilic asthma, and cough

## 2023-01-05 NOTE — ADDENDUM
[FreeTextEntry1] : Documented by Abimbola Flanagan acting as a scribe for Dr. Korey Bautista on 01/05/2023 .\par \par All medical record entries made by the Scribe were at my, Dr. Korey Bautista's, direction and personally dictated by me on 01/05/2023. I have reviewed the chart and agree that the record accurately reflects my personal performance of the history, physical exam, assessment and plan. I have also personally directed, reviewed, and agree with the discharge instructions.

## 2023-01-05 NOTE — ASSESSMENT
-- DO NOT REPLY / DO NOT REPLY ALL --  -- Message is from the [facility]--    COVID-19 Universal Screening: N/A - Not about scheduling    Transfer to RN      Chief Complaint:  Unable to urinate    Caller Information       Type Contact Phone    05/18/2021 12:34 PM CDT Phone (Incoming) Nabila (Emergency Contact) [phone]          Provider Name:  Dr. Hi PICHARDO Practice Site Name:  [facility] Phone Number:  none     [FreeTextEntry1] : Ms. Smith is 67 year old Female who has a history of allergic (IgE mediated) and eosinophilic asthma, allergies, OW, urticaria, pancreatic insufficiency, GERD, and OSAS who is present in the office now via video call with viral induced asthmatic bronchitis \par \par problem 1: moderate persistent asthma - active \par -add Prednisone 20 mg x 7 days, 10 mg x 7 days \par -Information sheet given to the patient to be reviewed, this medication is never to be used without consulting the prescribing physician. Proper dietary restraint is necessary specifically salt containing foods, if any reaction may occur should be reported. \par \par -continue Levalbuterol via Hand held nebulizer, Q6H\par -continue Advair  at 2 inhalations BID\par -continue to use Spiriva 1 inhalation QD\par -continue Qvar Redihaler 80 2 puffs BID \par -continue to use Singulair 10 mg before bed \par -rescue inhaler PRN \par -recommended humidifier \par Asthma is believed to be caused by inherited (genetic) and environmental factor, but its exact cause is unknown. Asthma may be triggered by allergens, lung infections, or irritants in the air. Asthma triggers are different for each person \par -Inhaler technique reviewed as well as oral hygiene techniques reviewed with patient. Avoidance of cold air, extremes of temperature, rescue inhaler should be used before exercise. Order of medication reviewed with patient. Recommended use of a cool mist humidifier in the bedroom. \par \par problem 2: eosinophilic asthma (stable)\par -Candidate for Nucala / Fasenra\par -The safety and efficacy of Nucala was established in three double-blind, randomized, placebo-controlled trials in patients with severe asthma. Compared to a placebo, patients with severe asthma receiving Nucala had fewer exacerbation requiring hospitalization and/or emergency department visits, and a longer time to first exacerbation. In addition, patients with severe asthma receiving Nucala or Fasenra experienced greater reductions in their daily maintenance oral corticosteroid dose, while maintaining asthma control compared with patients receiving placebo. Treatment with Nucala did not result in a significant improvement in lung function, as measured by the volume of air exhaled by patients in one second. The most common side effects include: headache, injection site reactions, back pain, weakness, and fatigue; hypersensitivity reactions can occur within hours or days including swelling of the face, mouth, and tongue, fainting, dizziness, hives, breathing problems, and rash; herpes zoster infections have occurred. The drug is a monoclonal antibody that inhibits interleukin -5 which helps regular eosinophils, a type of white blood cell that contributes to asthma. The over-production of eosinophils can cause inflammation in the lungs, increasing the frequency of asthma attacks. Patients must also take other medications, including high dose inhaled corticosteroids and at least one additional asthma drug\par \par problem 3: allergic (IgE mediated) asthma\par -continue to use Xolair 225 q monthly - attempting weaning. (q 7 weeks)\par -Xolair is a recombinant DNA- derived humanized IgG1K monoclonal antibody that selectively binds ot human immunoglobulin E (IgE). Xolair is produced by a Chinese hamster ovary cell suspension culture in nutrient medium containing the antibiotic gentamicin. Gentamicin is not detectable in the final product. Xolair is a sterile, white, preservative free, lyophilized powder contained in a single use vial that is reconstituted with sterile water for suspension. Side effects include: wheezing, tightness of the chest, trouble breathing, hives, skin rash, feeling anxious or light-headed, fainting, warmth or tingling under skin, or swelling of face, lips, or tongue \par \par problem 4: GERD / pancreatic insufficiency (3/2022)\par -continue Pepcid 40mg QHS \par -continue generic Nexium 40 mg before breakfast\par -Continue Creon \par -Rule of 2s: avoid eating too much, eating too late, eating too spicy, eating two hours before bed\par -Things to avoid including overeating, spicy foods, tight clothing, eating within three hours of bed, this list is not all inclusive. \par -For treatment of reflux, possible options discussed including diet control, H2 blockers, PPIs, as well as coating motility agents discussed as treatment options. Timing of meals and proximity of last meal to sleep were discussed. If symptoms persist, a formal gastrointestinal evaluation is needed. \par \par problem 5: allergic rhinitis\par -Olopatadine 0.6% 1 sniff each nostril BID \par -continue to use Veramyst 1 sniff each nostril BID\par -continue Xyzal 5 mg before bed \par \par -Environmental measures for allergies were encouraged including mattress and pillow cover, air purifier, and environmental controls. \par \par problem 6: KRISTA\par -continue to use CPAP machine - tolerating it well and seeing benefits\par -recommended to use Insomnitol prn\par \par -Sleep apnea is associated with adverse clinical consequences which an affect most organ systems. Cardiovascular disease risk includes arrhythmias, atrial fibrillation, hypertension, coronary artery disease, and stroke. Metabolic disorders include diabetes type 2, non-alcoholic fatty liver disease. Mood disorder especially depression; and cognitive decline especially in the elderly. Associations with chronic reflux/Dennis’s esophagus some but not all inclusive. \par -Reasons to assess this include arousal consistent with hypopnea; respiratory events most prominent in REM sleep or supine position; therefore sleep staging and body position are important for accurate diagnosis and estimation of AHI. \par \par problem 7: leg pain (improved) / back pain\par -Recommend seeing Dio Duenas (chiropractor)\par -recommended to continue f/u with Dr. Cheyanne Yousif\par -recommended continue to use L-carnitine\par -being added CoQ-10 at 200 mg\par -being added vitamin D 50,000 units once a month\par \par problem 8: obesity\par -Recommend "Muniq" OTC for weight loss, energy, and blood sugar / Tyson Arriaga et al\par -Recommend she see Dr. Paniagua/ [place] \par -Weight loss, exercise, and diet control were discussed and are highly encouraged. Treatment options were given such as, aqua therapy, and contacting a nutritionist. Recommended to use the elliptical, stationary bike, less use of treadmill. Obesity is associated with worsening asthma, shortness of breath, and potential for cardiac disease, diabetes, and other underlying medical conditions. \par -recommended to consider yoga\par \par Problem 8A: Poor mechanics of breathing\par -Recommended Wim Hof and Buteyko breathing techniques \par - Proper breathing techniques were reviewed with an emphasis on exhalation. Patient instructed to breath in for 1 second and out for four seconds. Patient was encouraged not to talk while walking. \par \par problem 9: idiopathic urticaria\par - It is recommended that she hold all of her supplements and then restart them one at a time. \par -continue to use Xolair 225 q monthly - attempting taper to off. \par \par problem 10: fatigue\par -recommended to go gluten free\par -recommended to look at number of hours she is sleeping as well as her CPAP pressures \par \par problem 11: health maintenance \par - Recommended SaNotize \par -Recommend seeing Dio Duenas for back pain\par - recommended SPM \par - s/pCOVID-19 vaccines (Moderna) x3\par -s/p flu shot - 2021\par -recommended strep pneumonia vaccines: Prevnar-13 vaccine, followed by Pneumo vaccine 23 one year following\par -recommended early intervention for URIs\par -recommended regular osteoporosis evaluations\par -recommended early dermatological evaluations\par -recommended after the age of 50 to the age of 70, colonoscopy every 5 years \par  \par \par F/U in 4 months - SPI / NIOX\par She is encouraged to call with any changes, concerns, or questions.

## 2023-01-05 NOTE — HISTORY OF PRESENT ILLNESS
[Home] : at home, [unfilled] , at the time of the visit. [Medical Office: ([facility])___] : at the medical office located in  [Verbal consent obtained from patient] : the patient, [unfilled] [FreeTextEntry1] : Ms. Smith is a 67 year old female with a history of allergic rhinitis, asthma, GERD, obesity, low vitamin D, KRISTA, eosinophilic asthma, and cough presenting to the office today via video call for a follow up visit. Her chief complaint is \par \par -she notes s/p URI with associated dyspnea and cough\par -she notes progressive dyspnea post infection\par -she notes increased usage of nebulizer  \par -she notes mild productive cough with clear mucus\par -she notes dysphonia \par -she notes chest pressure due to dyspnea \par -she denies sinus issues \par -she notes now on Advair, Qvar, and nebulizer BID\par -she notes use of nebulizer before inhalers \par -she notes compliant with CPAP \par \par -she denies any headaches, nausea, emesis, fever, chills, sweats, chest pain, wheezing, palpitations, constipation, diarrhea, vertigo, dysphagia, heartburn, reflux, itchy eyes, itchy ears, leg swelling, leg pain, arthralgias, myalgias, or sour taste in the mouth.

## 2023-02-01 ENCOUNTER — TRANSCRIPTION ENCOUNTER (OUTPATIENT)
Age: 68
End: 2023-02-01

## 2023-02-02 ENCOUNTER — TRANSCRIPTION ENCOUNTER (OUTPATIENT)
Age: 68
End: 2023-02-02

## 2023-02-14 ENCOUNTER — APPOINTMENT (OUTPATIENT)
Dept: NEPHROLOGY | Facility: CLINIC | Age: 68
End: 2023-02-14
Payer: MEDICARE

## 2023-02-14 DIAGNOSIS — R80.9 PROTEINURIA, UNSPECIFIED: ICD-10-CM

## 2023-02-14 PROCEDURE — 99214 OFFICE O/P EST MOD 30 MIN: CPT | Mod: 95

## 2023-02-14 RX ORDER — DULAGLUTIDE 1.5 MG/.5ML
1.5 INJECTION, SOLUTION SUBCUTANEOUS
Qty: 2 | Refills: 0 | Status: DISCONTINUED | COMMUNITY
Start: 2022-07-25 | End: 2023-02-14

## 2023-02-14 RX ORDER — LOSARTAN POTASSIUM 25 MG/1
25 TABLET, FILM COATED ORAL DAILY
Qty: 1 | Refills: 6 | Status: DISCONTINUED | COMMUNITY
Start: 2022-08-19 | End: 2023-02-14

## 2023-02-14 NOTE — HISTORY OF PRESENT ILLNESS
[Home] : at home, [unfilled] , at the time of the visit. [Medical Office: ([facility])___] : at the medical office located in  [Verbal consent obtained from patient] : the patient, [unfilled] [FreeTextEntry1] : 68 yo female with history of obesity, asthma, fibromyalgia, here for followup of proteinuria. \par She is following endo Dr EFRAIN Escobar for wt management and on metformin and Mounjaro\par Ramipril 2.5mg - she noticed dry cough, was given telmisartan and losartan and she states she had HTN.\par Went to a cardiologist Dr Dia and cardiac workup was ok and he had placed her on amodipine 5mg/ valsartan 160mg with improved BP \par She has a hard time losing the wt. Had been on prednisone for eosinophilic asthma\par Denies chronic NSAIDs\par Cr reportedly was normal on recent labs 0.6; alb:cr was 34; Hba1c 5.9\par /70 Wt 194

## 2023-02-14 NOTE — ASSESSMENT
[FreeTextEntry1] : 66 yo female with obesity fibromyalgia with albuminuria. 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 \par Tolerating valsartan\par Wt loss advised and she will continue to work with endo. \par Avoid chronic NSAID use\par Trend alb:cr ratio\par Followup in 6 months televisit ok then.

## 2023-02-16 ENCOUNTER — TRANSCRIPTION ENCOUNTER (OUTPATIENT)
Age: 68
End: 2023-02-16

## 2023-02-17 ENCOUNTER — NON-APPOINTMENT (OUTPATIENT)
Age: 68
End: 2023-02-17

## 2023-02-24 ENCOUNTER — OUTPATIENT (OUTPATIENT)
Dept: OUTPATIENT SERVICES | Facility: HOSPITAL | Age: 68
LOS: 1 days | Discharge: HOME | End: 2023-02-24

## 2023-02-24 DIAGNOSIS — C50.919 MALIGNANT NEOPLASM OF UNSPECIFIED SITE OF UNSPECIFIED FEMALE BREAST: ICD-10-CM

## 2023-02-24 DIAGNOSIS — Z98.890 OTHER SPECIFIED POSTPROCEDURAL STATES: Chronic | ICD-10-CM

## 2023-02-27 ENCOUNTER — RX RENEWAL (OUTPATIENT)
Age: 68
End: 2023-02-27

## 2023-02-28 ENCOUNTER — APPOINTMENT (OUTPATIENT)
Dept: HEMATOLOGY ONCOLOGY | Facility: CLINIC | Age: 68
End: 2023-02-28
Payer: MEDICARE

## 2023-02-28 VITALS
TEMPERATURE: 97 F | RESPIRATION RATE: 16 BRPM | OXYGEN SATURATION: 97 % | WEIGHT: 184.06 LBS | HEART RATE: 80 BPM | BODY MASS INDEX: 31.6 KG/M2 | SYSTOLIC BLOOD PRESSURE: 110 MMHG | DIASTOLIC BLOOD PRESSURE: 69 MMHG

## 2023-02-28 DIAGNOSIS — Z15.09 GENETIC SUSCEPTIBILITY TO OTHER DISEASE: ICD-10-CM

## 2023-02-28 DIAGNOSIS — Z15.89 GENETIC SUSCEPTIBILITY TO OTHER DISEASE: ICD-10-CM

## 2023-02-28 PROCEDURE — 99214 OFFICE O/P EST MOD 30 MIN: CPT

## 2023-02-28 RX ORDER — AMLODIPINE AND VALSARTAN 5; 160 MG/1; MG/1
5-160 TABLET, FILM COATED ORAL
Qty: 90 | Refills: 0 | Status: COMPLETED | COMMUNITY
Start: 2022-09-19 | End: 2023-02-28

## 2023-02-28 RX ORDER — LEVALBUTEROL HYDROCHLORIDE 0.63 MG/3ML
0.63 SOLUTION RESPIRATORY (INHALATION)
Qty: 1 | Refills: 3 | Status: COMPLETED | COMMUNITY
Start: 2018-11-16 | End: 2023-02-28

## 2023-02-28 RX ORDER — PREDNISONE 10 MG/1
10 TABLET ORAL
Qty: 50 | Refills: 0 | Status: COMPLETED | COMMUNITY
Start: 2023-01-05 | End: 2023-02-28

## 2023-02-28 RX ORDER — ERGOCALCIFEROL 1.25 MG/1
1.25 MG CAPSULE, LIQUID FILLED ORAL
Qty: 1 | Refills: 1 | Status: COMPLETED | COMMUNITY
Start: 2017-04-06 | End: 2023-02-28

## 2023-02-28 NOTE — REVIEW OF SYSTEMS
[Wheezing] : wheezing [Negative] : Allergic/Immunologic [FreeTextEntry2] : hotflashes controlled w/ effexor and neurontin [FreeTextEntry6] : h/o asthma [FreeTextEntry7] : reflux - controlled; pancreatic enzyme insufficiency [FreeTextEntry1] : h/o urticaria on Xolair

## 2023-02-28 NOTE — HISTORY OF PRESENT ILLNESS
[de-identified] : 66 year old female w/ history of right breast cancer transferring care from [facility] to [facility].\par \par [place] initially presented in 2007 when at the age of 51 a routine mammo/sono disclosed a new mass at the 10:00 position of the right breast.\par \par A core biopsy on 4/11/2007 disclosed atypical proliferation for which a incisional biopsy was recommended\par \par 4/23/2007: Dr. Pily Ramon completed excision of right breast mass which disclosed a 5 cm tumor with infiltrating ductal carcinoma measuring 2.5 cm and DCIS measuring 2.5 cm.  Tumor involved all margins; \par \par 5/15/2007:  Partial Mastectomy:  Patient subsequently underwent re-excision of margins\par Slides were reviewed at [facility]\par ER+/GA+;  Her 2 negative. \par Final Staging: Stage IIA (T2N0M0). \par \par Oncotype: 10 reflecting 7% risk of recurrence\par \par Adjuvant Treatment:\par No chemotherapy given\par Adjuvant Endocrine Therapy: initially Lupron/Tamoxifen --> Arimidex - completed 10 years of endocrine therapy - completed in 2017\par \par Patient currently follows w/ Dr. Colon\par Radiation Therapy @ [facility] from 7/2007 - 9/2007\par \par  [de-identified] : 2/28/2023\par Patient returns today for followup for history of breast cancer and to rule out metastatic breast cancer.\par Patient denies any SOB, CP, abdominal pain, bone pain or headache.\par Patient denies any breast masses, breast tenderness, skin changes or nipple discharge.\par c/o of hotflashes/night sweats - ongoing - uses Effexor and Neurontin for management; if she misses a day she notes intense hot flashes recur.  \par also patient can only take BRAND effexor and Neurontin b/c she is allergic to the fillers in the generic - whole body rash\par \par HEALTH MAINTENANCE:\par GENETICS: APC low penetrance variant (increased risk of CRC) \par MAMMO/SONO: 10/2022 - reviewed report from [facility]\par BREAST MRI: 2019 \par BONE DENSITY: Dr Malika Escobar follows her bone density and is scheduled for updated bw \par GI/COLONOSCOPY:  Dr. Ro - scheduled for 2/2022; also diagnosed with pancreatic enzyme insufficiency\par GYN EXAM: Dr. Cuate Ortiz  - annual paps\par PCP EXAM: No longer has PCP that she follows with \par DERMATOLOGY EXAM:  Dr. Ang - sees prn \par BREAST SURGEON: Dr. Colon - vipins annually January 2022\par \par

## 2023-02-28 NOTE — ASSESSMENT
[FreeTextEntry1] : 2007: Stage IIa right breast cancer; s/p lumpectomy, RT with Oncotype of 10; completed 10 years of endocrine therapy in 2017 (tamoxifen/Lupron followed by AI).  \par \par - LYNDA x 14 years\par - continue survivorship followup annually - did also discuss potential for survivorship program at [facility] for long term management\par - f/up 1 year\par - mammo/sono completed 10/2022 - reviewed report\par \par \par Hot Flashes\par - okay to continue Effexor XR 75 mg daily and Neurontin 900 mg tid\par - patient requires Brand name Neurontin Effexor XR - has allergy to fillers in generic Effexor - has tried in past and developed rash\par \par APC low penetrance variant-\par - continue f/up w/ Dr. Ro GI for colonoscpy screening every 5 years\par \par \par Obesity\par - encouraged ongoing weight loss/exercise to optimize BMI\par \par PMD at least annually with lipid checks/bloodwork, gyn at least annually and PAP test screening per their recommendations, colon cancer screening/colonoscopy at least every 10 years as recommended by PMD/GI , dermatology for annual skin checks  ophthalmology for annual eye exams\par \par Patient to have updated bw sent to us for review.\par Patient had an opportunity to have her questions asked and answered to her satisfaction.\par

## 2023-02-28 NOTE — PHYSICAL EXAM
[Fully active, able to carry on all pre-disease performance without restriction] : Status 0 - Fully active, able to carry on all pre-disease performance without restriction [Normal] : affect appropriate [de-identified] : s/p right lumpectomy surgical scar well healed; right breast smaller than left breast; No palpable masses b/l, no chest wall changes, no skin or nipple changes noted; no axillary adenopathy

## 2023-03-10 ENCOUNTER — TRANSCRIPTION ENCOUNTER (OUTPATIENT)
Age: 68
End: 2023-03-10

## 2023-05-12 ENCOUNTER — NON-APPOINTMENT (OUTPATIENT)
Age: 68
End: 2023-05-12

## 2023-05-12 ENCOUNTER — APPOINTMENT (OUTPATIENT)
Dept: PULMONOLOGY | Facility: CLINIC | Age: 68
End: 2023-05-12
Payer: MEDICARE

## 2023-05-12 VITALS
SYSTOLIC BLOOD PRESSURE: 110 MMHG | HEART RATE: 75 BPM | RESPIRATION RATE: 16 BRPM | TEMPERATURE: 97.3 F | OXYGEN SATURATION: 97 % | BODY MASS INDEX: 30.39 KG/M2 | DIASTOLIC BLOOD PRESSURE: 60 MMHG | WEIGHT: 178 LBS | HEIGHT: 64 IN

## 2023-05-12 DIAGNOSIS — J45.909 UNSPECIFIED ASTHMA, UNCOMPLICATED: ICD-10-CM

## 2023-05-12 PROCEDURE — 99204 OFFICE O/P NEW MOD 45 MIN: CPT | Mod: 25

## 2023-05-12 PROCEDURE — 94010 BREATHING CAPACITY TEST: CPT

## 2023-05-12 PROCEDURE — 99214 OFFICE O/P EST MOD 30 MIN: CPT | Mod: 25

## 2023-05-12 PROCEDURE — 95012 NITRIC OXIDE EXP GAS DETER: CPT

## 2023-05-12 NOTE — ASSESSMENT
[FreeTextEntry1] : Ms. Smith is 67 year old Female who has a history of allergic (IgE mediated) and eosinophilic asthma, allergies, OW, urticaria, pancreatic insufficiency, GERD, and OSAS S/P  viral induced asthmatic bronchitis 1/2023 - Stable on mounjaro \par \par problem 1: moderate persistent asthma - Quiet \par -s/p Prednisone 20 mg x 7 days, 10 mg x 7 days (1/2023)\par -Information sheet given to the patient to be reviewed, this medication is never to be used without consulting the prescribing physician. Proper dietary restraint is necessary specifically salt containing foods, if any reaction may occur should be reported. \par \par -continue Levalbuterol via Hand held nebulizer, Q6H\par -continue Advair  at 2 inhalations BID\par -continue to use Spiriva 1 inhalation QD\par -continue Qvar Redihaler 80 2 puffs BID \par -continue to use Singulair 10 mg before bed \par -rescue inhaler PRN \par -recommended humidifier \par Asthma is believed to be caused by inherited (genetic) and environmental factor, but its exact cause is unknown. Asthma may be triggered by allergens, lung infections, or irritants in the air. Asthma triggers are different for each person \par -Inhaler technique reviewed as well as oral hygiene techniques reviewed with patient. Avoidance of cold air, extremes of temperature, rescue inhaler should be used before exercise. Order of medication reviewed with patient. Recommended use of a cool mist humidifier in the bedroom. \par \par problem 2: eosinophilic asthma (stable)\par -Candidate for Nucala / Fasenra\par -The safety and efficacy of Nucala was established in three double-blind, randomized, placebo-controlled trials in patients with severe asthma. Compared to a placebo, patients with severe asthma receiving Nucala had fewer exacerbation requiring hospitalization and/or emergency department visits, and a longer time to first exacerbation. In addition, patients with severe asthma receiving Nucala or Fasenra experienced greater reductions in their daily maintenance oral corticosteroid dose, while maintaining asthma control compared with patients receiving placebo. Treatment with Nucala did not result in a significant improvement in lung function, as measured by the volume of air exhaled by patients in one second. The most common side effects include: headache, injection site reactions, back pain, weakness, and fatigue; hypersensitivity reactions can occur within hours or days including swelling of the face, mouth, and tongue, fainting, dizziness, hives, breathing problems, and rash; herpes zoster infections have occurred. The drug is a monoclonal antibody that inhibits interleukin -5 which helps regular eosinophils, a type of white blood cell that contributes to asthma. The over-production of eosinophils can cause inflammation in the lungs, increasing the frequency of asthma attacks. Patients must also take other medications, including high dose inhaled corticosteroids and at least one additional asthma drug\par \par problem 3: allergic (IgE mediated) asthma\par -OFF Xolair 225 q monthly - attempting weaning since 10/2023 \par -Xolair is a recombinant DNA- derived humanized IgG1K monoclonal antibody that selectively binds ot human immunoglobulin E (IgE). Xolair is produced by a Chinese hamster ovary cell suspension culture in nutrient medium containing the antibiotic gentamicin. Gentamicin is not detectable in the final product. Xolair is a sterile, white, preservative free, lyophilized powder contained in a single use vial that is reconstituted with sterile water for suspension. Side effects include: wheezing, tightness of the chest, trouble breathing, hives, skin rash, feeling anxious or light-headed, fainting, warmth or tingling under skin, or swelling of face, lips, or tongue \par \par problem 4: GERD / pancreatic insufficiency (3/2022)\par -continue Pepcid 40mg QHS \par -continue generic Nexium 40 mg before breakfast\par -Continue Creon \par -Rule of 2s: avoid eating too much, eating too late, eating too spicy, eating two hours before bed\par -Things to avoid including overeating, spicy foods, tight clothing, eating within three hours of bed, this list is not all inclusive. \par -For treatment of reflux, possible options discussed including diet control, H2 blockers, PPIs, as well as coating motility agents discussed as treatment options. Timing of meals and proximity of last meal to sleep were discussed. If symptoms persist, a formal gastrointestinal evaluation is needed. \par \par problem 5: allergic rhinitis\par -Olopatadine 0.6% 1 sniff each nostril BID \par -continue to use Veramyst 1 sniff each nostril BID\par -continue Xyzal 5 mg before bed \par \par -Environmental measures for allergies were encouraged including mattress and pillow cover, air purifier, and environmental controls. \par \par problem 6: KRISTA\par -continue to use CPAP machine - tolerating it well and seeing benefits\par -recommended to use Insomnitol prn\par \par -Sleep apnea is associated with adverse clinical consequences which an affect most organ systems. Cardiovascular disease risk includes arrhythmias, atrial fibrillation, hypertension, coronary artery disease, and stroke. Metabolic disorders include diabetes type 2, non-alcoholic fatty liver disease. Mood disorder especially depression; and cognitive decline especially in the elderly. Associations with chronic reflux/Dennis’s esophagus some but not all inclusive. \par -Reasons to assess this include arousal consistent with hypopnea; respiratory events most prominent in REM sleep or supine position; therefore sleep staging and body position are important for accurate diagnosis and estimation of AHI. \par \par problem 7: leg pain (improved) / back pain\par -Recommend seeing Dio Duenas (chiropractor)\par -recommended to continue f/u with Dr. Cheyanne Yousif\par -recommended continue to use L-carnitine\par -being added CoQ-10 at 200 mg\par -being added vitamin D 50,000 units once a month\par \par problem 8: obesity - on Mounjaro \par -Recommend "Muniq" OTC for weight loss, energy, and blood sugar / Tyson Arriaga et al\par -Recommend she see Dr. Paniagua/ [place] \par -Weight loss, exercise, and diet control were discussed and are highly encouraged. Treatment options were given such as, aqua therapy, and contacting a nutritionist. Recommended to use the elliptical, stationary bike, less use of treadmill. Obesity is associated with worsening asthma, shortness of breath, and potential for cardiac disease, diabetes, and other underlying medical conditions. \par -recommended to consider yoga\par \par Problem 8A: Poor mechanics of breathing\par -Recommended Ana Booth and Kia breathing techniques \par - Proper breathing techniques were reviewed with an emphasis on exhalation. Patient instructed to breath in for 1 second and out for four seconds. Patient was encouraged not to talk while walking. \par \par problem 9: idiopathic urticaria\par - It is recommended that she hold all of her supplements and then restart them one at a time. \par -continue to use Xolair 225 q monthly - attempting taper to off. \par \par problem 10: fatigue\par -recommended to go gluten free\par -recommended to look at number of hours she is sleeping as well as her CPAP pressures \par \par problem 11: health maintenance \par - Recommended SaNotize \par -Recommend seeing Dio Duenas for back pain\par - recommended SPM \par - s/pCOVID-19 vaccines (Moderna) x3\par -s/p flu shot - 2021\par -recommended strep pneumonia vaccines: Prevnar-13 vaccine, followed by Pneumo vaccine 23 one year following\par -recommended early intervention for URIs\par -recommended regular osteoporosis evaluations\par -recommended early dermatological evaluations\par -recommended after the age of 50 to the age of 70, colonoscopy every 5 years \par  \par \par F/U in 4 months - SPI / NIOX\par She is encouraged to call with any changes, concerns, or questions.

## 2023-05-12 NOTE — HISTORY OF PRESENT ILLNESS
[FreeTextEntry1] : Ms. Smith is a 67 year old female with a history of allergic rhinitis, asthma, GERD, obesity, low vitamin D, KRISTA, eosinophilic asthma, and cough presenting to the office today via video call for a follow up visit. Her chief complaint is \par - her energy level has been good \par - she notes she has not been exercising due to her back pain \par - she notes she has been stretching but she has very bad scoliosis \par - her vision has been okay \par - her balance has been good \par - she notes her weight is stable, shes losing a little bit due to mounjaro ; she has lost 12 lbs on it \par - she notes her memory / concentration is well \par - she notes her sleep is good ; she just got a new CPAP machine and it has been working very well \par - she notes she is now off Xolair \par -She denies any visual issues, headaches, nausea, vomiting, fever, chills, sweats, chest pains, chest pressure, diarrhea, constipation, dysphagia, myalgia, dizziness, leg swelling, leg pain, itchy eyes, itchy ears, heartburn, reflux, or sour taste in the mouth.

## 2023-05-12 NOTE — PROCEDURE
[FreeTextEntry1] : PFT reveals normal flows, with an FEV1 of   2.05L, which is  87% of predicted, with normal flow volume loop ; tested for asthma / SOB \par \par FENO was 79 ; normal value being less than 25\par Fractional exhaled nitric oxide (FENO) is regarded as a simple, noninvasive method for assessing eosinophilic airway inflammation. Produced by a variety of cells within the lung, nitric oxide (NO) concentrations are generally low in healthy individuals. However, high concentrations of NO appear to be involved in nonspecific host defense mechanisms and chronic inflammatory diseases such as asthma. The American Thoracic Society (ATS) therefore has recommended using FENO to aid in the diagnosis and monitoring of eosinophilic airway inflammation and asthma, and for identifying steroid responsive individuals whose chronic respiratory symptoms may be airway inflammation.

## 2023-05-12 NOTE — ADDENDUM
[FreeTextEntry1] : Documented by Mar Turner acting as a scribe for Dr. Korey Bautista on 05/12/2023 \par \par All medical record entries made by the Scribe were at my, Dr. Korey Bautista's, direction and personally dictated by me on 05/12/2023 . I have reviewed the chart and agree that the record accurately reflects my personal performance of the history, physical exam, assessment and plan. I have also personally directed, reviewed, and agree with the discharge instructions.

## 2023-05-22 ENCOUNTER — APPOINTMENT (OUTPATIENT)
Dept: SURGERY | Facility: CLINIC | Age: 68
End: 2023-05-22
Payer: MEDICARE

## 2023-05-22 PROCEDURE — 99213K: CUSTOM

## 2023-07-17 ENCOUNTER — RX RENEWAL (OUTPATIENT)
Age: 68
End: 2023-07-17

## 2023-08-21 ENCOUNTER — RX RENEWAL (OUTPATIENT)
Age: 68
End: 2023-08-21

## 2023-10-10 ENCOUNTER — APPOINTMENT (OUTPATIENT)
Dept: PULMONOLOGY | Facility: CLINIC | Age: 68
End: 2023-10-10
Payer: MEDICARE

## 2023-10-10 VITALS
SYSTOLIC BLOOD PRESSURE: 110 MMHG | BODY MASS INDEX: 30.05 KG/M2 | HEART RATE: 88 BPM | WEIGHT: 176 LBS | TEMPERATURE: 97.8 F | HEIGHT: 64 IN | OXYGEN SATURATION: 96 % | RESPIRATION RATE: 18 BRPM | DIASTOLIC BLOOD PRESSURE: 62 MMHG

## 2023-10-10 PROCEDURE — 95012 NITRIC OXIDE EXP GAS DETER: CPT

## 2023-10-10 PROCEDURE — 94010 BREATHING CAPACITY TEST: CPT

## 2023-10-10 PROCEDURE — 99214 OFFICE O/P EST MOD 30 MIN: CPT | Mod: 25

## 2023-10-10 RX ORDER — EPINEPHRINE 0.3 MG/.3ML
0.3 INJECTION INTRAMUSCULAR
Qty: 1 | Refills: 1 | Status: ACTIVE | COMMUNITY
Start: 2023-10-10 | End: 1900-01-01

## 2023-10-18 ENCOUNTER — APPOINTMENT (OUTPATIENT)
Dept: ULTRASOUND IMAGING | Facility: CLINIC | Age: 68
End: 2023-10-18
Payer: MEDICARE

## 2023-10-18 ENCOUNTER — OUTPATIENT (OUTPATIENT)
Dept: OUTPATIENT SERVICES | Facility: HOSPITAL | Age: 68
LOS: 1 days | End: 2023-10-18
Payer: MEDICARE

## 2023-10-18 ENCOUNTER — APPOINTMENT (OUTPATIENT)
Dept: MAMMOGRAPHY | Facility: CLINIC | Age: 68
End: 2023-10-18
Payer: MEDICARE

## 2023-10-18 DIAGNOSIS — Z00.8 ENCOUNTER FOR OTHER GENERAL EXAMINATION: ICD-10-CM

## 2023-10-18 DIAGNOSIS — Z98.890 OTHER SPECIFIED POSTPROCEDURAL STATES: Chronic | ICD-10-CM

## 2023-10-18 PROCEDURE — 77067 SCR MAMMO BI INCL CAD: CPT | Mod: 26

## 2023-10-18 PROCEDURE — 76641 ULTRASOUND BREAST COMPLETE: CPT

## 2023-10-18 PROCEDURE — 76641 ULTRASOUND BREAST COMPLETE: CPT | Mod: 26,50,GY

## 2023-10-18 PROCEDURE — 77063 BREAST TOMOSYNTHESIS BI: CPT

## 2023-10-18 PROCEDURE — 77063 BREAST TOMOSYNTHESIS BI: CPT | Mod: 26

## 2023-10-18 PROCEDURE — 77067 SCR MAMMO BI INCL CAD: CPT

## 2023-11-01 ENCOUNTER — RX RENEWAL (OUTPATIENT)
Age: 68
End: 2023-11-01

## 2023-11-02 ENCOUNTER — RX RENEWAL (OUTPATIENT)
Age: 68
End: 2023-11-02

## 2023-11-02 RX ORDER — TAPINAROF 10 MG/1000MG
1 CREAM TOPICAL
Qty: 60 | Refills: 0 | Status: ACTIVE | COMMUNITY
Start: 2023-11-02 | End: 1900-01-01

## 2023-11-06 ENCOUNTER — RX RENEWAL (OUTPATIENT)
Age: 68
End: 2023-11-06

## 2023-11-09 ENCOUNTER — RX RENEWAL (OUTPATIENT)
Age: 68
End: 2023-11-09

## 2023-11-28 ENCOUNTER — RX RENEWAL (OUTPATIENT)
Age: 68
End: 2023-11-28

## 2024-01-19 ENCOUNTER — APPOINTMENT (OUTPATIENT)
Dept: PULMONOLOGY | Facility: CLINIC | Age: 69
End: 2024-01-19

## 2024-02-14 ENCOUNTER — TRANSCRIPTION ENCOUNTER (OUTPATIENT)
Age: 69
End: 2024-02-14

## 2024-02-15 ENCOUNTER — RX RENEWAL (OUTPATIENT)
Age: 69
End: 2024-02-15

## 2024-02-15 ENCOUNTER — TRANSCRIPTION ENCOUNTER (OUTPATIENT)
Age: 69
End: 2024-02-15

## 2024-02-20 ENCOUNTER — NON-APPOINTMENT (OUTPATIENT)
Age: 69
End: 2024-02-20

## 2024-02-21 ENCOUNTER — OUTPATIENT (OUTPATIENT)
Dept: OUTPATIENT SERVICES | Facility: HOSPITAL | Age: 69
LOS: 1 days | Discharge: HOME | End: 2024-02-21

## 2024-02-21 DIAGNOSIS — Z98.890 OTHER SPECIFIED POSTPROCEDURAL STATES: Chronic | ICD-10-CM

## 2024-02-21 DIAGNOSIS — C50.919 MALIGNANT NEOPLASM OF UNSPECIFIED SITE OF UNSPECIFIED FEMALE BREAST: ICD-10-CM

## 2024-03-05 ENCOUNTER — APPOINTMENT (OUTPATIENT)
Dept: HEMATOLOGY ONCOLOGY | Facility: CLINIC | Age: 69
End: 2024-03-05
Payer: MEDICARE

## 2024-03-05 VITALS
TEMPERATURE: 97.3 F | BODY MASS INDEX: 30.46 KG/M2 | RESPIRATION RATE: 16 BRPM | DIASTOLIC BLOOD PRESSURE: 72 MMHG | HEART RATE: 82 BPM | SYSTOLIC BLOOD PRESSURE: 117 MMHG | WEIGHT: 177.47 LBS | OXYGEN SATURATION: 96 %

## 2024-03-05 DIAGNOSIS — Z79.899 OTHER LONG TERM (CURRENT) DRUG THERAPY: ICD-10-CM

## 2024-03-05 DIAGNOSIS — C50.411 MALIGNANT NEOPLASM OF UPPER-OUTER QUADRANT OF RIGHT FEMALE BREAST: ICD-10-CM

## 2024-03-05 DIAGNOSIS — Z17.0 MALIGNANT NEOPLASM OF UPPER-OUTER QUADRANT OF RIGHT FEMALE BREAST: ICD-10-CM

## 2024-03-05 DIAGNOSIS — R23.2 FLUSHING: ICD-10-CM

## 2024-03-05 PROCEDURE — G2211 COMPLEX E/M VISIT ADD ON: CPT

## 2024-03-05 PROCEDURE — 99214 OFFICE O/P EST MOD 30 MIN: CPT

## 2024-03-12 PROBLEM — R23.2 HOT FLASHES: Status: ACTIVE | Noted: 2020-11-11

## 2024-03-12 PROBLEM — C50.411 CARCINOMA OF UPPER-OUTER QUADRANT OF RIGHT BREAST IN FEMALE, ESTROGEN RECEPTOR POSITIVE: Status: ACTIVE | Noted: 2021-12-21

## 2024-03-12 PROBLEM — Z79.899 HIGH RISK MEDICATION USE: Status: ACTIVE | Noted: 2023-02-28

## 2024-03-12 NOTE — REVIEW OF SYSTEMS
[Wheezing] : wheezing [Negative] : Allergic/Immunologic [FreeTextEntry6] : h/o asthma [FreeTextEntry2] : hotflashes controlled w/ effexor and neurontin [FreeTextEntry7] : reflux - controlled; pancreatic enzyme insufficiency [FreeTextEntry1] : h/o urticaria on Xolair

## 2024-03-12 NOTE — HISTORY OF PRESENT ILLNESS
[de-identified] : 66 year old female w/ history of right breast cancer transferring care from [facility] to [facility].\par  \par  Savanna initially presented in 2007 when at the age of 51 a routine mammo/sono disclosed a new mass at the 10:00 position of the right breast.\par  \par  A core biopsy on 4/11/2007 disclosed atypical proliferation for which a incisional biopsy was recommended\par  \par  4/23/2007: Dr. Pily Ramon completed excision of right breast mass which disclosed a 5 cm tumor with infiltrating ductal carcinoma measuring 2.5 cm and DCIS measuring 2.5 cm.  Tumor involved all margins; \par  \par  5/15/2007:  Partial Mastectomy:  Patient subsequently underwent re-excision of margins\par  Slides were reviewed at [facility]\par  ER+/MT+;  Her 2 negative. \par  Final Staging: Stage IIA (T2N0M0). \par  \par  Oncotype: 10 reflecting 7% risk of recurrence\par  \par  Adjuvant Treatment:\par  No chemotherapy given\par  Adjuvant Endocrine Therapy: initially Lupron/Tamoxifen --> Arimidex - completed 10 years of endocrine therapy - completed in 2017\par  \par  Patient currently follows w/ Dr. Colon\par  Radiation Therapy @ [facility] from 7/2007 - 9/2007\par  \par   [de-identified] : 3/5/2024 Patient returns today for followup for history of breast cancer and to rule out metastatic breast cancer. Patient denies any SOB, CP, abdominal pain, bone pain or headache. Patient denies any breast masses, breast tenderness, skin changes or nipple discharge. c/o of hotflashes/night sweats - ongoing - uses Effexor and Neurontin for management; if she misses a day she notes intense hot flashes recur.   also patient can only take BRAND effexor and Neurontin b/c she is allergic to the fillers in the generic - whole body rash  HEALTH MAINTENANCE: GENETICS: APC low penetrance variant (increased risk of CRC)  MAMMO/SONO: 10/2023- reviewed report from [facility] in PACS BREAST MRI: 2019  BONE DENSITY: Dr Malika Escobar follows her bone density GI/COLONOSCOPY:  Dr. Ro - scheduled for 2/2022; also diagnosed with pancreatic enzyme insufficiency GYN EXAM: Dr. Cuate Ortiz  - annual paps PCP EXAM: No longer has PCP that she follows with  DERMATOLOGY EXAM:  Dr. Ashia ortiz prn  BREAST SURGEON: Dr. Colon - angel annually January 2022

## 2024-03-12 NOTE — ASSESSMENT
[FreeTextEntry1] : 2007: Stage IIa right breast cancer; s/p lumpectomy, RT with Oncotype of 10; completed 10 years of endocrine therapy in 2017 (tamoxifen/Lupron followed by AI).    - LYNDA x 14 years - continue survivorship followup annually - did also discuss potential for survivorship program at [facility] for long term management - f/up 1 year - continue f/up w/ Dr. Colon - mammo/sono completed 10/202e - reviewed report   Hot Flashes - okay to continue Effexor XR 75 mg daily and Neurontin 900 mg tid - patient requires Brand name Neurontin Effexor XR - has allergy to fillers in generic Effexor - has tried in past and developed rash  APC low penetrance variant- - continue f/up w/ Dr. Ro GI for colonoscpy screening every 5 years   Obesity - encouraged ongoing weight loss/exercise to optimize BMI  PMD at least annually with lipid checks/bloodwork, gyn at least annually and PAP test screening per their recommendations, colon cancer screening/colonoscopy at least every 10 years as recommended by PMD/GI , dermatology for annual skin checks  ophthalmology for annual eye exams  Patient to have updated bw sent to us for review. Patient had an opportunity to have her questions asked and answered to her satisfaction.

## 2024-03-12 NOTE — PHYSICAL EXAM
[Fully active, able to carry on all pre-disease performance without restriction] : Status 0 - Fully active, able to carry on all pre-disease performance without restriction [Normal] : grossly intact [de-identified] : s/p right lumpectomy surgical scar well healed; right breast smaller than left breast; No palpable masses b/l, no chest wall changes, no skin or nipple changes noted; no axillary adenopathy

## 2024-03-15 ENCOUNTER — APPOINTMENT (OUTPATIENT)
Dept: PULMONOLOGY | Facility: CLINIC | Age: 69
End: 2024-03-15
Payer: MEDICARE

## 2024-03-15 VITALS
WEIGHT: 175 LBS | HEIGHT: 64 IN | TEMPERATURE: 97.2 F | HEART RATE: 70 BPM | RESPIRATION RATE: 16 BRPM | OXYGEN SATURATION: 98 % | BODY MASS INDEX: 29.88 KG/M2 | DIASTOLIC BLOOD PRESSURE: 64 MMHG | SYSTOLIC BLOOD PRESSURE: 116 MMHG

## 2024-03-15 DIAGNOSIS — G47.33 OBSTRUCTIVE SLEEP APNEA (ADULT) (PEDIATRIC): ICD-10-CM

## 2024-03-15 DIAGNOSIS — J45.909 UNSPECIFIED ASTHMA, UNCOMPLICATED: ICD-10-CM

## 2024-03-15 DIAGNOSIS — K21.9 GASTRO-ESOPHAGEAL REFLUX DISEASE W/OUT ESOPHAGITIS: ICD-10-CM

## 2024-03-15 DIAGNOSIS — J30.9 ALLERGIC RHINITIS, UNSPECIFIED: ICD-10-CM

## 2024-03-15 DIAGNOSIS — R79.89 OTHER SPECIFIED ABNORMAL FINDINGS OF BLOOD CHEMISTRY: ICD-10-CM

## 2024-03-15 DIAGNOSIS — R06.02 SHORTNESS OF BREATH: ICD-10-CM

## 2024-03-15 DIAGNOSIS — J82.83 EOSINOPHILIC ASTHMA: ICD-10-CM

## 2024-03-15 DIAGNOSIS — E66.9 OBESITY, UNSPECIFIED: ICD-10-CM

## 2024-03-15 PROCEDURE — 99214 OFFICE O/P EST MOD 30 MIN: CPT | Mod: 25

## 2024-03-15 PROCEDURE — 94010 BREATHING CAPACITY TEST: CPT

## 2024-03-15 PROCEDURE — 95012 NITRIC OXIDE EXP GAS DETER: CPT

## 2024-03-15 RX ORDER — EPINEPHRINE 0.3 MG/.3ML
0.3 INJECTION, SOLUTION INTRAMUSCULAR
Qty: 2 | Refills: 1 | Status: ACTIVE | COMMUNITY
Start: 2024-03-15 | End: 1900-01-01

## 2024-03-15 NOTE — ADDENDUM
[FreeTextEntry1] : Documented by Markie Chatman acting as a scribe for Dr. Korey Bautista on 03/15/2024. All medical record entries made by the Scribe were at my, Dr. Korey Bautista's, direction and personally dictated by me on 03/15/2024. I have reviewed the chart and agree that the record accurately reflects my personal performance of the history, physical exam, assessment and plan. I have also personally directed, reviewed, and agree with the discharge instructions.

## 2024-03-15 NOTE — HISTORY OF PRESENT ILLNESS
[FreeTextEntry1] : Ms. Smith is a 68 year old female with a history of allergic rhinitis, asthma, GERD, obesity, low vitamin D, KRISTA, eosinophilic asthma, and cough presenting to the office today for a follow-up pulmonary evaluation. Her chief complaint is   -she notes feeling generally well -she notes good quality of sleep -she notes sleeping for 8 hours -she denies exercising -she notes diet is good -she notes refusing to exercise due to pain  -she notes her sleep is restful  -she notes sinuses are quiet -she denies taking any new medications, vitamins, or supplements -she notes using her CPAP  -she denies any headaches, nausea, emesis, fever, chills, sweats, chest pain, chest pressure, coughing, wheezing, palpitations, diarrhea, constipation, dysphagia, vertigo, leg swelling, itchy eyes, itchy ears, heartburn, reflux, or sour taste in the mouth.

## 2024-03-15 NOTE — PHYSICAL EXAM
[No Acute Distress] : no acute distress [Normal Oropharynx] : normal oropharynx [Normal Appearance] : normal appearance [No Neck Mass] : no neck mass [III] : Mallampati Class: III [Normal S1, S2] : normal s1, s2 [Normal Rate/Rhythm] : normal rate/rhythm [No Murmurs] : no murmurs [No Resp Distress] : no resp distress [No Abnormalities] : no abnormalities [Benign] : benign [Clear to Auscultation Bilaterally] : clear to auscultation bilaterally [Normal Gait] : normal gait [No Clubbing] : no clubbing [FROM] : FROM [No Cyanosis] : no cyanosis [No Edema] : no edema [No Focal Deficits] : no focal deficits [Normal Color/ Pigmentation] : normal color/ pigmentation [Normal Affect] : normal affect [Oriented x3] : oriented x3 [TextBox_68] : I:E 1:3; Clear

## 2024-03-15 NOTE — ASSESSMENT
[FreeTextEntry1] : Ms. Smith is 68 year old Female who has a history of allergic (IgE mediated) and eosinophilic asthma, allergies, OW, urticaria, pancreatic insufficiency, GERD, and OSAS S/P viral induced asthmatic bronchitis 1/2023 - Stable on mounjaro (not improving)- fibromyalgia   problem 1: moderate persistent asthma - Quiet -s/p Prednisone 20 mg x 7 days, 10 mg x 7 days (1/2023) -Information sheet given to the patient to be reviewed, this medication is never to be used without consulting the prescribing physician. Proper dietary restraint is necessary specifically salt containing foods, if any reaction may occur should be reported. -continue Levalbuterol via Hand held nebulizer, Q6H -continue Advair  at 2 inhalations BID -continue to use Spiriva 1 inhalation QD -continue Qvar Redihaler 80 2 puffs BID -continue to use Singulair 10 mg before bed -rescue inhaler PRN -recommended humidifier Asthma is believed to be caused by inherited (genetic) and environmental factor, but its exact cause is unknown. Asthma may be triggered by allergens, lung infections, or irritants in the air. Asthma triggers are different for each person -Inhaler technique reviewed as well as oral hygiene techniques reviewed with patient. Avoidance of cold air, extremes of temperature, rescue inhaler should be used before exercise. Order of medication reviewed with patient. Recommended use of a cool mist humidifier in the bedroom.  problem 2: eosinophilic asthma (stable) -Candidate for Nucala / Fasenra -The safety and efficacy of Nucala was established in three double-blind, randomized, placebo-controlled trials in patients with severe asthma. Compared to a placebo, patients with severe asthma receiving Nucala had fewer exacerbation requiring hospitalization and/or emergency department visits, and a longer time to first exacerbation. In addition, patients with severe asthma receiving Nucala or Fasenra experienced greater reductions in their daily maintenance oral corticosteroid dose, while maintaining asthma control compared with patients receiving placebo. Treatment with Nucala did not result in a significant improvement in lung function, as measured by the volume of air exhaled by patients in one second. The most common side effects include: headache, injection site reactions, back pain, weakness, and fatigue; hypersensitivity reactions can occur within hours or days including swelling of the face, mouth, and tongue, fainting, dizziness, hives, breathing problems, and rash; herpes zoster infections have occurred. The drug is a monoclonal antibody that inhibits interleukin -5 which helps regular eosinophils, a type of white blood cell that contributes to asthma. The over-production of eosinophils can cause inflammation in the lungs, increasing the frequency of asthma attacks. Patients must also take other medications, including high dose inhaled corticosteroids and at least one additional asthma drug  problem 3: allergic (IgE mediated) asthma -OFF Xolair 225 q monthly - attempting weaning since 10/2023 -Xolair is a recombinant DNA- derived humanized IgG1K monoclonal antibody that selectively binds ot human immunoglobulin E (IgE). Xolair is produced by a Chinese hamster ovary cell suspension culture in nutrient medium containing the antibiotic gentamicin. Gentamicin is not detectable in the final product. Xolair is a sterile, white, preservative free, lyophilized powder contained in a single use vial that is reconstituted with sterile water for suspension. Side effects include: wheezing, tightness of the chest, trouble breathing, hives, skin rash, feeling anxious or light-headed, fainting, warmth or tingling under skin, or swelling of face, lips, or tongue  problem 4: GERD / pancreatic insufficiency (3/2022) -continue Pepcid 40mg QHS -continue generic Nexium 40 mg before breakfast -Continue Creon -Rule of 2s: avoid eating too much, eating too late, eating too spicy, eating two hours before bed -Things to avoid including overeating, spicy foods, tight clothing, eating within three hours of bed, this list is not all inclusive. -For treatment of reflux, possible options discussed including diet control, H2 blockers, PPIs, as well as coating motility agents discussed as treatment options. Timing of meals and proximity of last meal to sleep were discussed. If symptoms persist, a formal gastrointestinal evaluation is needed.  problem 5: allergic rhinitis -Olopatadine 0.6% 1 sniff each nostril BID -continue to use Veramyst 1 sniff each nostril BID -continue Xyzal 5 mg before bed -Environmental measures for allergies were encouraged including mattress and pillow cover, air purifier, and environmental controls.  problem 6: KRISTA -continue to use CPAP machine - tolerating it well and seeing benefits -recommended to use Insomnitol prn -Sleep apnea is associated with adverse clinical consequences which an affect most organ systems. Cardiovascular disease risk includes arrhythmias, atrial fibrillation, hypertension, coronary artery disease, and stroke. Metabolic disorders include diabetes type 2, non-alcoholic fatty liver disease. Mood disorder especially depression; and cognitive decline especially in the elderly. Associations with chronic reflux/Dennis's esophagus some but not all inclusive. -Reasons to assess this include arousal consistent with hypopnea; respiratory events most prominent in REM sleep or supine position; therefore sleep staging and body position are important for accurate diagnosis and estimation of AHI.  problem 7: leg pain (improved) / back pain -Recommend seeing Dio Duenas (chiropractor) -recommended to continue f/u with Dr. Cheyanne Yousif -recommended continue to use L-carnitine -being added CoQ-10 at 200 mg -being added vitamin D 50,000 units once a month  problem 8: obesity - on Mounjaro -recommended Berberine OTC supplement  -Recommend "Muniq" OTC for weight loss, energy, and blood sugar / Tyson Dailey et al -Recommend she see Dr. Paniagua/ [place] -Weight loss, exercise, and diet control were discussed and are highly encouraged. Treatment options were given such as, aqua therapy, and contacting a nutritionist. Recommended to use the elliptical, stationary bike, less use of treadmill. Obesity is associated with worsening asthma, shortness of breath, and potential for cardiac disease, diabetes, and other underlying medical conditions. -recommended to consider yoga  Problem 8A: Poor mechanics of breathing -Recommended Ana Booth and Buteyko breathing techniques - Proper breathing techniques were reviewed with an emphasis on exhalation. Patient instructed to breath in for 1 second and out for four seconds. Patient was encouraged not to talk while walking.  problem 9: idiopathic urticaria - It is recommended that she hold all of her supplements and then restart them one at a time. -continue to use Xolair 225 q monthly - attempting taper to off.  problem 10: fatigue -recommended to go gluten free -recommended to look at number of hours she is sleeping as well as her CPAP pressures  problem 11: health maintenance -recommended PEMF Mat -recommended RSV vaccine if over the age of 60  - Recommended SaNotize -Recommend seeing Dio Duenas for back pain - recommended SPM - s/pCOVID-19 vaccines (Moderna) x3 -s/p flu shot - 2023 -recommended strep pneumonia vaccines: Prevnar-13 vaccine, followed by Pneumo vaccine 23 one year following -recommended early intervention for URIs -recommended regular osteoporosis evaluations -recommended early dermatological evaluations -recommended after the age of 50 to the age of 70, colonoscopy every 5 years  F/P in 4 months - SPI / NIOX She is encouraged to call with any changes, concerns, or questions.

## 2024-03-27 ENCOUNTER — RX RENEWAL (OUTPATIENT)
Age: 69
End: 2024-03-27

## 2024-04-02 RX ORDER — BECLOMETHASONE DIPROPIONATE HFA 80 UG/1
80 AEROSOL, METERED RESPIRATORY (INHALATION) TWICE DAILY
Qty: 31.8 | Refills: 2 | Status: ACTIVE | COMMUNITY
Start: 2019-07-11 | End: 1900-01-01

## 2024-04-15 ENCOUNTER — APPOINTMENT (OUTPATIENT)
Dept: SURGERY | Facility: CLINIC | Age: 69
End: 2024-04-15
Payer: MEDICARE

## 2024-04-15 PROCEDURE — 99213K: CUSTOM

## 2024-05-21 ENCOUNTER — RX RENEWAL (OUTPATIENT)
Age: 69
End: 2024-05-21

## 2024-05-21 RX ORDER — CALCIPOTRIENE AND BETAMETHASONE DIPROPIONATE 50; .5 UG/G; MG/G
0.005-0.064 SUSPENSION TOPICAL
Qty: 60 | Refills: 4 | Status: ACTIVE | COMMUNITY
Start: 2022-07-13 | End: 1900-01-01

## 2024-05-21 RX ORDER — CALCIPOTRIENE AND BETAMETHASONE DIPROPIONATE 50; .5 UG/G; MG/G
0.005-0.064 AEROSOL, FOAM TOPICAL
Qty: 60 | Refills: 4 | Status: ACTIVE | COMMUNITY
Start: 2017-02-01 | End: 1900-01-01

## 2024-07-29 ENCOUNTER — RX RENEWAL (OUTPATIENT)
Age: 69
End: 2024-07-29

## 2024-09-09 ENCOUNTER — APPOINTMENT (OUTPATIENT)
Dept: PULMONOLOGY | Facility: CLINIC | Age: 69
End: 2024-09-09
Payer: MEDICARE

## 2024-09-09 VITALS — HEIGHT: 64 IN | BODY MASS INDEX: 29.02 KG/M2 | WEIGHT: 170 LBS

## 2024-09-09 DIAGNOSIS — J30.9 ALLERGIC RHINITIS, UNSPECIFIED: ICD-10-CM

## 2024-09-09 DIAGNOSIS — J82.83 EOSINOPHILIC ASTHMA: ICD-10-CM

## 2024-09-09 DIAGNOSIS — R06.02 SHORTNESS OF BREATH: ICD-10-CM

## 2024-09-09 DIAGNOSIS — E66.9 OBESITY, UNSPECIFIED: ICD-10-CM

## 2024-09-09 DIAGNOSIS — J45.901 ACUTE BRONCHITIS, UNSPECIFIED: ICD-10-CM

## 2024-09-09 DIAGNOSIS — J20.9 ACUTE BRONCHITIS, UNSPECIFIED: ICD-10-CM

## 2024-09-09 DIAGNOSIS — J45.909 UNSPECIFIED ASTHMA, UNCOMPLICATED: ICD-10-CM

## 2024-09-09 DIAGNOSIS — R79.89 OTHER SPECIFIED ABNORMAL FINDINGS OF BLOOD CHEMISTRY: ICD-10-CM

## 2024-09-09 DIAGNOSIS — G47.33 OBSTRUCTIVE SLEEP APNEA (ADULT) (PEDIATRIC): ICD-10-CM

## 2024-09-09 PROCEDURE — 99214 OFFICE O/P EST MOD 30 MIN: CPT

## 2024-09-09 RX ORDER — PREDNISONE 10 MG/1
10 TABLET ORAL
Qty: 50 | Refills: 0 | Status: ACTIVE | COMMUNITY
Start: 2024-09-09 | End: 1900-01-01

## 2024-09-09 RX ORDER — ALBUTEROL SULFATE 2.5 MG/3ML
(2.5 MG/3ML) SOLUTION RESPIRATORY (INHALATION) EVERY 6 HOURS
Qty: 3 | Refills: 2 | Status: ACTIVE | COMMUNITY
Start: 2024-09-09 | End: 1900-01-01

## 2024-09-09 RX ORDER — LEVALBUTEROL HYDROCHLORIDE 0.63 MG/3ML
0.63 SOLUTION RESPIRATORY (INHALATION)
Qty: 120 | Refills: 1 | Status: DISCONTINUED | COMMUNITY
Start: 2024-09-09 | End: 2024-09-09

## 2024-09-10 ENCOUNTER — NON-APPOINTMENT (OUTPATIENT)
Age: 69
End: 2024-09-10

## 2024-09-10 NOTE — ASSESSMENT
[FreeTextEntry1] : Ms. Smith is 68 year old Female who has a history of allergic (IgE mediated) and eosinophilic asthma, allergies, OW, urticaria, pancreatic insufficiency, GERD, and OSAS S/P viral induced asthmatic bronchitis 1/2023 - now w/ acute bronchitis / asthmatic bronchitis   problem 1: moderate persistent asthma - Quiet -s/p Prednisone 20 mg x 7 days, 10 mg x 7 days (1/2023), 9/2024 -Information sheet given to the patient to be reviewed, this medication is never to be used without consulting the prescribing physician. Proper dietary restraint is necessary specifically salt containing foods, if any reaction may occur should be reported. -continue Levalbuterol via Hand held nebulizer, Q6H (restart) -continue Advair  at 2 inhalations BID -continue to use Spiriva 1 inhalation QD -continue Qvar Redihaler 80 2 puffs BID -continue to use Singulair 10 mg before bed -rescue inhaler PRN -recommended humidifier Asthma is believed to be caused by inherited (genetic) and environmental factor, but its exact cause is unknown. Asthma may be triggered by allergens, lung infections, or irritants in the air. Asthma triggers are different for each person -Inhaler technique reviewed as well as oral hygiene techniques reviewed with patient. Avoidance of cold air, extremes of temperature, rescue inhaler should be used before exercise. Order of medication reviewed with patient. Recommended use of a cool mist humidifier in the bedroom.  Problem 1A acute bronchitis -Biaxin 500 BID 7 days  problem 2: eosinophilic asthma (stable) -Candidate for Nucala / Fasenra / Dupixent -The safety and efficacy of Nucala was established in three double-blind, randomized, placebo-controlled trials in patients with severe asthma. Compared to a placebo, patients with severe asthma receiving Nucala had fewer exacerbation requiring hospitalization and/or emergency department visits, and a longer time to first exacerbation. In addition, patients with severe asthma receiving Nucala or Fasenra experienced greater reductions in their daily maintenance oral corticosteroid dose, while maintaining asthma control compared with patients receiving placebo. Treatment with Nucala did not result in a significant improvement in lung function, as measured by the volume of air exhaled by patients in one second. The most common side effects include: headache, injection site reactions, back pain, weakness, and fatigue; hypersensitivity reactions can occur within hours or days including swelling of the face, mouth, and tongue, fainting, dizziness, hives, breathing problems, and rash; herpes zoster infections have occurred. The drug is a monoclonal antibody that inhibits interleukin -5 which helps regular eosinophils, a type of white blood cell that contributes to asthma. The over-production of eosinophils can cause inflammation in the lungs, increasing the frequency of asthma attacks. Patients must also take other medications, including high dose inhaled corticosteroids and at least one additional asthma drug  problem 3: allergic (IgE mediated) asthma -OFF Xolair 225 q monthly - attempting weaning since 10/2023 -Xolair is a recombinant DNA- derived humanized IgG1K monoclonal antibody that selectively binds ot human immunoglobulin E (IgE). Xolair is produced by a Chinese hamster ovary cell suspension culture in nutrient medium containing the antibiotic gentamicin. Gentamicin is not detectable in the final product. Xolair is a sterile, white, preservative free, lyophilized powder contained in a single use vial that is reconstituted with sterile water for suspension. Side effects include: wheezing, tightness of the chest, trouble breathing, hives, skin rash, feeling anxious or light-headed, fainting, warmth or tingling under skin, or swelling of face, lips, or tongue  problem 4: GERD / pancreatic insufficiency (3/2022) -continue Pepcid 40mg QHS -continue generic Nexium 40 mg before breakfast -Continue Creon -Rule of 2s: avoid eating too much, eating too late, eating too spicy, eating two hours before bed -Things to avoid including overeating, spicy foods, tight clothing, eating within three hours of bed, this list is not all inclusive. -For treatment of reflux, possible options discussed including diet control, H2 blockers, PPIs, as well as coating motility agents discussed as treatment options. Timing of meals and proximity of last meal to sleep were discussed. If symptoms persist, a formal gastrointestinal evaluation is needed.  problem 5: allergic rhinitis -Olopatadine 0.6% 1 sniff each nostril BID -continue to use Veramyst 1 sniff each nostril BID -continue Xyzal 5 mg before bed -Environmental measures for allergies were encouraged including mattress and pillow cover, air purifier, and environmental controls.  problem 6: KRISTA -continue to use CPAP machine - tolerating it well and seeing benefits -recommended to use Insomnitol prn -Sleep apnea is associated with adverse clinical consequences which an affect most organ systems. Cardiovascular disease risk includes arrhythmias, atrial fibrillation, hypertension, coronary artery disease, and stroke. Metabolic disorders include diabetes type 2, non-alcoholic fatty liver disease. Mood disorder especially depression; and cognitive decline especially in the elderly. Associations with chronic reflux/Dennis's esophagus some but not all inclusive. -Reasons to assess this include arousal consistent with hypopnea; respiratory events most prominent in REM sleep or supine position; therefore sleep staging and body position are important for accurate diagnosis and estimation of AHI.  problem 7: leg pain (improved) / back pain -Recommend seeing Dio Duenas (chiropractor) -recommended to continue f/u with Dr. Cheyanne Yousif -recommended continue to use L-carnitine -being added CoQ-10 at 200 mg -being added vitamin D 50,000 units once a month  problem 8: obesity - on Mounjaro -recommended Berberine OTC supplement  -Recommend "Muniq" OTC for weight loss, energy, and blood sugar / Tyson Dailey et al -Recommend she see Dr. Paniagua/ [place] -Weight loss, exercise, and diet control were discussed and are highly encouraged. Treatment options were given such as, aqua therapy, and contacting a nutritionist. Recommended to use the elliptical, stationary bike, less use of treadmill. Obesity is associated with worsening asthma, shortness of breath, and potential for cardiac disease, diabetes, and other underlying medical conditions. -recommended to consider yoga  Problem 8A: Poor mechanics of breathing -Recommended Wijames Hof and Buteyko breathing techniques - Proper breathing techniques were reviewed with an emphasis on exhalation. Patient instructed to breath in for 1 second and out for four seconds. Patient was encouraged not to talk while walking.  problem 9: idiopathic urticaria - It is recommended that she hold all of her supplements and then restart them one at a time. -continue to use Xolair 225 q monthly - attempting taper to off.  problem 10: fatigue -recommended to go gluten free -recommended to look at number of hours she is sleeping as well as her CPAP pressures  problem 11: health maintenance -recommended PEMF Mat -recommended RSV vaccine if over the age of 60  - Recommended SaNotize -Recommend seeing Dio Duenas for back pain - recommended SPM - s/pCOVID-19 vaccines (Moderna) x3 -s/p flu shot - 2023 -recommended strep pneumonia vaccines: Prevnar-13 vaccine, followed by Pneumo vaccine 23 one year following -recommended early intervention for URIs -recommended regular osteoporosis evaluations -recommended early dermatological evaluations -recommended after the age of 50 to the age of 70, colonoscopy every 5 years  F/P in 4 months - SPI / NIOX She is encouraged to call with any changes, concerns, or questions.

## 2024-09-10 NOTE — HISTORY OF PRESENT ILLNESS
[Home] : at home, [unfilled] , at the time of the visit. [Medical Office: ([facility])___] : at the medical office located in  [Verbal consent obtained from patient] : the patient, [unfilled] [FreeTextEntry1] : Ms. Smith is a 68 year old female with a history of allergic rhinitis, asthma, GERD, obesity, low vitamin D, KRISTA, eosinophilic asthma, and cough presenting to the office today via video call for a follow-up pulmonary evaluation. Her chief complaint is   -she notes being sick since last week -she notes having 103 F (which has gone down to 100 F now), coughing which originates in her chest -she notes going to urgent care, and they gave her Prednisone 40 mg x 5 days -she notes not being able to use Advair -she notes her Albuterol Rx has  last year -she notes pain at the superior area of her head, and pain when coughing -she notes sinuses are quiet  -she denies any headaches, nausea, emesis, chills, sweats, chest pain, chest pressure, wheezing, palpitations, diarrhea, constipation, dysphagia, vertigo, arthralgias, myalgias, leg swelling, itchy eyes, itchy ears, heartburn, reflux, or sour taste in the mouth.

## 2024-09-10 NOTE — ADDENDUM
[FreeTextEntry1] : Documented by Navi Cancino acting as a scribe for Dr. Korey Bautista on 09/09/2024. All medical record entries made by the Scribe were at my, Dr. Korey Bautista's, direction and personally dictated by me on 09/09/2024. I have reviewed the chart and agree that the record accurately reflects my personal performance of the history, physical exam, assessment and plan. I have also personally directed, reviewed, and agree with the discharge instructions.

## 2024-09-10 NOTE — HISTORY OF PRESENT ILLNESS
[Home] : at home, [unfilled] , at the time of the visit. [Medical Office: ([facility])___] : at the medical office located in  [Verbal consent obtained from patient] : the patient, [unfilled] [FreeTextEntry1] : Ms. Smith is a 68 year old female with a history of allergic rhinitis, asthma, GERD, obesity, low vitamin D, KRISTA, eosinophilic asthma, and cough presenting to the office today via video call for a follow-up pulmonary evaluation. Her chief complaint is   -she notes being sick since last week -she notes having 103 F (which has gone down to 100 F now), coughing which originates in her chest -she notes going to urgent care, and they gave her Prednisone 40 mg x 5 days -she notes not being able to use Advair -she notes her Albuterol Rx has  last year -she notes pain at the superior area of her head, and pain when coughing -she notes sinuses are quiet  -she denies any headaches, nausea, emesis, chills, sweats, chest pain, chest pressure, wheezing, palpitations, diarrhea, constipation, dysphagia, vertigo, arthralgias, myalgias, leg swelling, itchy eyes, itchy ears, heartburn, reflux, or sour taste in the mouth.

## 2024-09-11 DIAGNOSIS — Z11.59 ENCOUNTER FOR SCREENING FOR OTHER VIRAL DISEASES: ICD-10-CM

## 2024-09-12 LAB
RAPID RVP RESULT: NOT DETECTED
SARS-COV-2 RNA PNL RESP NAA+PROBE: NOT DETECTED

## 2024-09-13 NOTE — PROCEDURE
57 y/o male with h/o AFib, indwelling Aguilera, CAD s/p stents, CVA, DMT 2, Hypertension, osteomyelitis s/p partial Right calcanectomy & soft tissue debridement by Podiatry with Arterial duplex showing Diffuse abnormal monophasic flow throughout the right leg and the posterior tibial and peroneal arteries are not seen.       1. Recommend CT Angio of abdomen with lower extremity runoff (sCr normal at 0.86, exam ordered)  2. Recommend changing therapeutic lovenox to a heparin drip for possible angiogram monday or tuesday next week  3. Discussed high likelihood that pt may require a more proximal amputation; again, he does not wish to move forward with this until all other options are attempted  4. Vascular surgery will be available if pt amenable to amputation for non salvageable R foot  5. Daily dressing changes  6. Discussed with Dr. Neville [FreeTextEntry1] : PFTs revealed normal flows; FEV1 was 1.82 L, which is 78.2% of predicted; normal flow volume loop.  PFTs were performed to evaluate for SOB  FENO was 64; a normal value being less than 25 Fractional exhaled nitric oxide (FENO) is regarded as a simple, noninvasive method for assessing eosinophilic airway inflammation. Produced by a variety of cells within the lung, nitric oxide (NO) concentrations are generally low in healthy individuals. However, high concentrations of NO appear to be involved in nonspecific host defense mechanisms and chronic inflammatory diseases such as asthma. The American Thoracic Society (ATS) therefore has recommended using FENO to aid in the diagnosis and monitoring of eosinophilic airway inflammation and asthma, and for identifying steroid responsive individuals whose chronic respiratory symptoms may be caused by airway inflammation.   The American Thoracic Society (ATS) strongly recommends the use of FeNO measurement to aid in the assessment, management, and long-term monitoring of asthma. In their 2011 clinical practice guideline, the ATS emphasizes the importance of using FeNO.

## 2024-09-27 ENCOUNTER — APPOINTMENT (OUTPATIENT)
Dept: PULMONOLOGY | Facility: CLINIC | Age: 69
End: 2024-09-27
Payer: MEDICARE

## 2024-09-27 VITALS
BODY MASS INDEX: 30.73 KG/M2 | SYSTOLIC BLOOD PRESSURE: 132 MMHG | WEIGHT: 180 LBS | OXYGEN SATURATION: 97 % | HEART RATE: 70 BPM | HEIGHT: 64 IN | DIASTOLIC BLOOD PRESSURE: 74 MMHG | TEMPERATURE: 97.6 F | RESPIRATION RATE: 16 BRPM

## 2024-09-27 DIAGNOSIS — R06.02 SHORTNESS OF BREATH: ICD-10-CM

## 2024-09-27 DIAGNOSIS — R79.89 OTHER SPECIFIED ABNORMAL FINDINGS OF BLOOD CHEMISTRY: ICD-10-CM

## 2024-09-27 DIAGNOSIS — J82.83 EOSINOPHILIC ASTHMA: ICD-10-CM

## 2024-09-27 DIAGNOSIS — J30.9 ALLERGIC RHINITIS, UNSPECIFIED: ICD-10-CM

## 2024-09-27 DIAGNOSIS — K21.9 GASTRO-ESOPHAGEAL REFLUX DISEASE W/OUT ESOPHAGITIS: ICD-10-CM

## 2024-09-27 DIAGNOSIS — E66.9 OBESITY, UNSPECIFIED: ICD-10-CM

## 2024-09-27 DIAGNOSIS — G47.33 OBSTRUCTIVE SLEEP APNEA (ADULT) (PEDIATRIC): ICD-10-CM

## 2024-09-27 DIAGNOSIS — J45.909 UNSPECIFIED ASTHMA, UNCOMPLICATED: ICD-10-CM

## 2024-09-27 PROCEDURE — 94727 GAS DIL/WSHOT DETER LNG VOL: CPT

## 2024-09-27 PROCEDURE — 94010 BREATHING CAPACITY TEST: CPT

## 2024-09-27 PROCEDURE — 99214 OFFICE O/P EST MOD 30 MIN: CPT | Mod: 25

## 2024-09-27 PROCEDURE — 94729 DIFFUSING CAPACITY: CPT

## 2024-09-27 PROCEDURE — 95012 NITRIC OXIDE EXP GAS DETER: CPT

## 2024-09-27 NOTE — ADDENDUM
[FreeTextEntry1] : Documented by Jacob Lizarraga acting as a scribe for Dr. Korey Bautista on 09/27/2024 All medical record entries made by the Scribe were at my, Dr. Korey Bautista's, direction and personally dictated by me on 09/27/2024 . I have reviewed the chart and agree that the record accurately reflects my personal performance of the history, physical exam, assessment and plan. I have also personally directed, reviewed, and agree with the discharge instructions.

## 2024-09-27 NOTE — PROCEDURE
[FreeTextEntry1] : Full PFT reveals normal flows; FEV1 was 2.14L which is 99% of predicted; normal lung volumes; normal diffusion at 14.85, which is 78% of predicted; normal flow volume loop. PFTs were performed to evaluate for asthma  FENO was 54; a normal value being less than 25 Fractional exhaled nitric oxide (FENO) is regarded as a simple, noninvasive method for assessing eosinophilic airway inflammation. Produced by a variety of cells within the lung, nitric oxide (NO) concentrations are generally low in healthy individuals. However, high concentrations of NO appear to be involved in nonspecific host defense mechanisms and chronic inflammatory diseases such as asthma. The American Thoracic Society (ATS) therefore has recommended using FENO to aid in the diagnosis and monitoring of eosinophilic airway inflammation and asthma, and for identifying steroid responsive individuals whose chronic respiratory symptoms may be caused by airway inflammation.

## 2024-09-27 NOTE — HISTORY OF PRESENT ILLNESS
[FreeTextEntry1] : Ms. Smith is a 68 year old female with a history of allergic rhinitis, asthma, GERD, obesity, low vitamin D, KRISTA, eosinophilic asthma, and cough presenting to the office today for a follow-up pulmonary evaluation. Her chief complaint is   -she denies coughing/wheezing -she notes back pain limits walking -she notes stable sinuses -she denies dysphonia -she notes good quality of sleep -she notes sleeping for 8 hours -she notes still on Mounjaro - hasn't seen significant weight loss -she notes bowels are regular  -she notes appetite is stable -she notes energy levels are good -she notes vision is stable   -she denies any headaches, nausea, emesis, fever, chills, sweats, chest pain, chest pressure, coughing, wheezing, palpitations, diarrhea, constipation, dysphagia, vertigo, leg swelling, itchy eyes, itchy ears, heartburn, reflux, or sour taste in the mouth.

## 2024-09-27 NOTE — ASSESSMENT
[FreeTextEntry1] : Ms. Smith is 68 year old Female who has a history of allergic (IgE mediated) and eosinophilic asthma, allergies, OW, urticaria, pancreatic insufficiency, GERD, and OSAS S/P viral induced asthmatic bronchitis 1/2023 - s/p w/ acute bronchitis / asthmatic bronchitis 9/2024 resolved  problem 1: moderate persistent asthma  -s/p Prednisone 20 mg x 7 days, 10 mg x 7 days (1/2023), 9/2024 -Information sheet given to the patient to be reviewed, this medication is never to be used without consulting the prescribing physician. Proper dietary restraint is necessary specifically salt containing foods, if any reaction may occur should be reported. -continue Levalbuterol via Hand held nebulizer, Q6H (restart) -continue Advair  at 2 inhalations BID -continue to use Spiriva 1 inhalation QD -continue Qvar Redihaler 80 2 puffs BID -continue to use Singulair 10 mg before bed -rescue inhaler PRN -recommended humidifier Asthma is believed to be caused by inherited (genetic) and environmental factor, but its exact cause is unknown. Asthma may be triggered by allergens, lung infections, or irritants in the air. Asthma triggers are different for each person -Inhaler technique reviewed as well as oral hygiene techniques reviewed with patient. Avoidance of cold air, extremes of temperature, rescue inhaler should be used before exercise. Order of medication reviewed with patient. Recommended use of a cool mist humidifier in the bedroom.  Problem 1A acute bronchitis - 9/2024 -Biaxin 500 BID 7 days  problem 2: eosinophilic asthma (stable) -Candidate for Nucala / Fasenra / Dupixent -The safety and efficacy of Nucala was established in three double-blind, randomized, placebo-controlled trials in patients with severe asthma. Compared to a placebo, patients with severe asthma receiving Nucala had fewer exacerbation requiring hospitalization and/or emergency department visits, and a longer time to first exacerbation. In addition, patients with severe asthma receiving Nucala or Fasenra experienced greater reductions in their daily maintenance oral corticosteroid dose, while maintaining asthma control compared with patients receiving placebo. Treatment with Nucala did not result in a significant improvement in lung function, as measured by the volume of air exhaled by patients in one second. The most common side effects include: headache, injection site reactions, back pain, weakness, and fatigue; hypersensitivity reactions can occur within hours or days including swelling of the face, mouth, and tongue, fainting, dizziness, hives, breathing problems, and rash; herpes zoster infections have occurred. The drug is a monoclonal antibody that inhibits interleukin -5 which helps regular eosinophils, a type of white blood cell that contributes to asthma. The over-production of eosinophils can cause inflammation in the lungs, increasing the frequency of asthma attacks. Patients must also take other medications, including high dose inhaled corticosteroids and at least one additional asthma drug  problem 3: allergic (IgE mediated) asthma -OFF Xolair 225 q monthly - attempting weaning since 10/2023 -Xolair is a recombinant DNA- derived humanized IgG1K monoclonal antibody that selectively binds ot human immunoglobulin E (IgE). Xolair is produced by a Chinese hamster ovary cell suspension culture in nutrient medium containing the antibiotic gentamicin. Gentamicin is not detectable in the final product. Xolair is a sterile, white, preservative free, lyophilized powder contained in a single use vial that is reconstituted with sterile water for suspension. Side effects include: wheezing, tightness of the chest, trouble breathing, hives, skin rash, feeling anxious or light-headed, fainting, warmth or tingling under skin, or swelling of face, lips, or tongue  problem 4: GERD / pancreatic insufficiency (3/2022) -continue Pepcid 40mg QHS -continue generic Nexium 40 mg before breakfast -Continue Creon -Rule of 2s: avoid eating too much, eating too late, eating too spicy, eating two hours before bed -Things to avoid including overeating, spicy foods, tight clothing, eating within three hours of bed, this list is not all inclusive. -For treatment of reflux, possible options discussed including diet control, H2 blockers, PPIs, as well as coating motility agents discussed as treatment options. Timing of meals and proximity of last meal to sleep were discussed. If symptoms persist, a formal gastrointestinal evaluation is needed.  problem 5: allergic rhinitis -Olopatadine 0.6% 1 sniff each nostril BID -continue to use Veramyst 1 sniff each nostril BID -continue Xyzal 5 mg before bed -Environmental measures for allergies were encouraged including mattress and pillow cover, air purifier, and environmental controls.  problem 6: KRISTA -continue to use CPAP machine - tolerating it well and seeing benefits -recommended to use Insomnitol prn -Sleep apnea is associated with adverse clinical consequences which an affect most organ systems. Cardiovascular disease risk includes arrhythmias, atrial fibrillation, hypertension, coronary artery disease, and stroke. Metabolic disorders include diabetes type 2, non-alcoholic fatty liver disease. Mood disorder especially depression; and cognitive decline especially in the elderly. Associations with chronic reflux/Dennis's esophagus some but not all inclusive. -Reasons to assess this include arousal consistent with hypopnea; respiratory events most prominent in REM sleep or supine position; therefore sleep staging and body position are important for accurate diagnosis and estimation of AHI.  problem 7: leg pain (improved) / back pain -Recommend seeing Dio Duenas (chiropractor) -recommended to continue f/u with Dr. Cheyanne Yousif -recommended continue to use L-carnitine -being added CoQ-10 at 200 mg -being added vitamin D 50,000 units once a month  problem 8: obesity - on Mounjaro -recommended Berberine OTC supplement  -Recommend "Muniq" OTC for weight loss, energy, and blood sugar / Tyson Dailey et al -Weight loss, exercise, and diet control were discussed and are highly encouraged. Treatment options were given such as, aqua therapy, and contacting a nutritionist. Recommended to use the elliptical, stationary bike, less use of treadmill. Obesity is associated with worsening asthma, shortness of breath, and potential for cardiac disease, diabetes, and other underlying medical conditions. -recommended to consider yoga  Problem 8A: Poor mechanics of breathing -Recommended Wijames Hof and Buteyko breathing techniques - Proper breathing techniques were reviewed with an emphasis on exhalation. Patient instructed to breath in for 1 second and out for four seconds. Patient was encouraged not to talk while walking.  problem 9: idiopathic urticaria - It is recommended that she hold all of her supplements and then restart them one at a time. -continue to use Xolair 225 q monthly - attempting taper to off.  problem 10: fatigue -recommended to go gluten free -recommended to look at number of hours she is sleeping as well as her CPAP pressures  problem 11: health maintenance -recommended PEMF Mat -recommended RSV vaccine if over the age of 60  - Recommended SaNotize -Recommend seeing Dio Duenas for back pain - recommended SPM - s/pCOVID-19 vaccines (Moderna) x3 -s/p flu shot - 2023 -recommended strep pneumonia vaccines: Prevnar-13 vaccine, followed by Pneumo vaccine 23 one year following -recommended early intervention for URIs -recommended regular osteoporosis evaluations -recommended early dermatological evaluations -recommended after the age of 50 to the age of 70, colonoscopy every 5 years  F/P in 4 months - SPI / NIOX She is encouraged to call with any changes, concerns, or questions.

## 2024-09-30 RX ORDER — LEVALBUTEROL HYDROCHLORIDE 0.63 MG/3ML
0.63 SOLUTION RESPIRATORY (INHALATION)
Qty: 360 | Refills: 0 | Status: ACTIVE | COMMUNITY
Start: 2024-09-30 | End: 1900-01-01

## 2024-10-14 ENCOUNTER — RX RENEWAL (OUTPATIENT)
Age: 69
End: 2024-10-14

## 2024-10-21 ENCOUNTER — OUTPATIENT (OUTPATIENT)
Dept: OUTPATIENT SERVICES | Facility: HOSPITAL | Age: 69
LOS: 1 days | End: 2024-10-21
Payer: MEDICARE

## 2024-10-21 ENCOUNTER — APPOINTMENT (OUTPATIENT)
Dept: ULTRASOUND IMAGING | Facility: CLINIC | Age: 69
End: 2024-10-21
Payer: MEDICARE

## 2024-10-21 ENCOUNTER — APPOINTMENT (OUTPATIENT)
Dept: MAMMOGRAPHY | Facility: CLINIC | Age: 69
End: 2024-10-21
Payer: MEDICARE

## 2024-10-21 DIAGNOSIS — Z00.8 ENCOUNTER FOR OTHER GENERAL EXAMINATION: ICD-10-CM

## 2024-10-21 DIAGNOSIS — Z98.890 OTHER SPECIFIED POSTPROCEDURAL STATES: Chronic | ICD-10-CM

## 2024-10-21 PROCEDURE — 77063 BREAST TOMOSYNTHESIS BI: CPT | Mod: 26

## 2024-10-21 PROCEDURE — 77067 SCR MAMMO BI INCL CAD: CPT | Mod: 26

## 2024-10-21 PROCEDURE — 76641 ULTRASOUND BREAST COMPLETE: CPT | Mod: 26,50,GZ

## 2024-10-23 ENCOUNTER — RX RENEWAL (OUTPATIENT)
Age: 69
End: 2024-10-23

## 2024-11-20 PROCEDURE — 76641 ULTRASOUND BREAST COMPLETE: CPT

## 2024-11-20 PROCEDURE — 77063 BREAST TOMOSYNTHESIS BI: CPT

## 2024-11-20 PROCEDURE — 77067 SCR MAMMO BI INCL CAD: CPT

## 2024-11-26 ENCOUNTER — RX RENEWAL (OUTPATIENT)
Age: 69
End: 2024-11-26

## 2024-12-24 ENCOUNTER — RX RENEWAL (OUTPATIENT)
Age: 69
End: 2024-12-24

## 2025-02-21 ENCOUNTER — APPOINTMENT (OUTPATIENT)
Dept: PULMONOLOGY | Facility: CLINIC | Age: 70
End: 2025-02-21

## 2025-03-03 ENCOUNTER — OUTPATIENT (OUTPATIENT)
Dept: OUTPATIENT SERVICES | Facility: HOSPITAL | Age: 70
LOS: 1 days | Discharge: HOME | End: 2025-03-03

## 2025-03-03 DIAGNOSIS — Z98.890 OTHER SPECIFIED POSTPROCEDURAL STATES: Chronic | ICD-10-CM

## 2025-03-04 ENCOUNTER — APPOINTMENT (OUTPATIENT)
Dept: HEMATOLOGY ONCOLOGY | Facility: CLINIC | Age: 70
End: 2025-03-04
Payer: MEDICARE

## 2025-03-04 VITALS
SYSTOLIC BLOOD PRESSURE: 146 MMHG | TEMPERATURE: 97 F | RESPIRATION RATE: 16 BRPM | BODY MASS INDEX: 31.79 KG/M2 | DIASTOLIC BLOOD PRESSURE: 88 MMHG | HEART RATE: 76 BPM | OXYGEN SATURATION: 97 % | WEIGHT: 185.19 LBS

## 2025-03-04 DIAGNOSIS — Z17.0 MALIGNANT NEOPLASM OF UPPER-OUTER QUADRANT OF RIGHT FEMALE BREAST: ICD-10-CM

## 2025-03-04 DIAGNOSIS — C50.411 MALIGNANT NEOPLASM OF UPPER-OUTER QUADRANT OF RIGHT FEMALE BREAST: ICD-10-CM

## 2025-03-04 PROCEDURE — 99213 OFFICE O/P EST LOW 20 MIN: CPT

## 2025-03-26 ENCOUNTER — RX RENEWAL (OUTPATIENT)
Age: 70
End: 2025-03-26

## 2025-03-31 ENCOUNTER — RX RENEWAL (OUTPATIENT)
Age: 70
End: 2025-03-31

## 2025-04-23 ENCOUNTER — APPOINTMENT (OUTPATIENT)
Dept: SURGERY | Facility: CLINIC | Age: 70
End: 2025-04-23
Payer: MEDICARE

## 2025-04-23 PROCEDURE — 99213K: CUSTOM

## 2025-04-29 ENCOUNTER — APPOINTMENT (OUTPATIENT)
Dept: PULMONOLOGY | Facility: CLINIC | Age: 70
End: 2025-04-29

## 2025-04-29 DIAGNOSIS — K21.9 GASTRO-ESOPHAGEAL REFLUX DISEASE W/OUT ESOPHAGITIS: ICD-10-CM

## 2025-04-29 DIAGNOSIS — R06.02 SHORTNESS OF BREATH: ICD-10-CM

## 2025-04-29 DIAGNOSIS — J82.83 EOSINOPHILIC ASTHMA: ICD-10-CM

## 2025-04-29 DIAGNOSIS — R05.9 COUGH, UNSPECIFIED: ICD-10-CM

## 2025-04-29 DIAGNOSIS — J20.9 ACUTE BRONCHITIS, UNSPECIFIED: ICD-10-CM

## 2025-04-29 DIAGNOSIS — J30.9 ALLERGIC RHINITIS, UNSPECIFIED: ICD-10-CM

## 2025-04-29 DIAGNOSIS — G47.33 OBSTRUCTIVE SLEEP APNEA (ADULT) (PEDIATRIC): ICD-10-CM

## 2025-04-29 DIAGNOSIS — E66.9 OBESITY, UNSPECIFIED: ICD-10-CM

## 2025-04-29 DIAGNOSIS — J45.901 ACUTE BRONCHITIS, UNSPECIFIED: ICD-10-CM

## 2025-04-29 DIAGNOSIS — J45.909 UNSPECIFIED ASTHMA, UNCOMPLICATED: ICD-10-CM

## 2025-04-29 PROCEDURE — 99215 OFFICE O/P EST HI 40 MIN: CPT | Mod: 2W

## 2025-04-29 RX ORDER — FLUTICASONE FUROATE 27.5 UG/1
27.5 SPRAY, METERED NASAL
Qty: 1 | Refills: 2 | Status: ACTIVE | COMMUNITY
Start: 2025-04-29 | End: 1900-01-01

## 2025-04-29 RX ORDER — MONTELUKAST 10 MG/1
10 TABLET, FILM COATED ORAL
Qty: 1 | Refills: 1 | Status: ACTIVE | COMMUNITY
Start: 2025-04-29 | End: 1900-01-01

## 2025-04-29 RX ORDER — OLOPATADINE HYDROCHLORIDE 665 UG/1
0.6 SPRAY, METERED NASAL
Qty: 1 | Refills: 3 | Status: ACTIVE | COMMUNITY
Start: 2025-04-29 | End: 1900-01-01

## 2025-05-04 ENCOUNTER — NON-APPOINTMENT (OUTPATIENT)
Age: 70
End: 2025-05-04

## 2025-05-05 LAB
RESP PATH DNA+RNA PNL NPH NAA+NON-PROBE: NOT DETECTED
SARS-COV-2 RNA RESP QL NAA+PROBE: NOT DETECTED

## 2025-05-05 RX ORDER — IPRATROPIUM BROMIDE AND ALBUTEROL SULFATE 2.5; .5 MG/3ML; MG/3ML
0.5-2.5 (3) SOLUTION RESPIRATORY (INHALATION)
Qty: 1 | Refills: 2 | Status: ACTIVE | COMMUNITY
Start: 2025-05-05 | End: 1900-01-01

## 2025-05-05 RX ORDER — BENZONATATE 200 MG/1
200 CAPSULE ORAL 3 TIMES DAILY
Qty: 1 | Refills: 0 | Status: ACTIVE | COMMUNITY
Start: 2025-05-05 | End: 1900-01-01

## 2025-05-05 RX ORDER — PREDNISONE 10 MG/1
10 TABLET ORAL
Qty: 20 | Refills: 0 | Status: ACTIVE | COMMUNITY
Start: 2025-05-05 | End: 1900-01-01

## 2025-05-13 ENCOUNTER — RX RENEWAL (OUTPATIENT)
Age: 70
End: 2025-05-13

## 2025-05-19 ENCOUNTER — RX RENEWAL (OUTPATIENT)
Age: 70
End: 2025-05-19

## 2025-05-27 ENCOUNTER — APPOINTMENT (OUTPATIENT)
Dept: PULMONOLOGY | Facility: CLINIC | Age: 70
End: 2025-05-27
Payer: MEDICARE

## 2025-05-27 VITALS
DIASTOLIC BLOOD PRESSURE: 72 MMHG | SYSTOLIC BLOOD PRESSURE: 124 MMHG | WEIGHT: 185 LBS | BODY MASS INDEX: 31.58 KG/M2 | RESPIRATION RATE: 16 BRPM | HEIGHT: 64 IN | OXYGEN SATURATION: 95 % | HEART RATE: 75 BPM | TEMPERATURE: 97.7 F

## 2025-05-27 DIAGNOSIS — G47.33 OBSTRUCTIVE SLEEP APNEA (ADULT) (PEDIATRIC): ICD-10-CM

## 2025-05-27 DIAGNOSIS — R06.02 SHORTNESS OF BREATH: ICD-10-CM

## 2025-05-27 DIAGNOSIS — E66.9 OBESITY, UNSPECIFIED: ICD-10-CM

## 2025-05-27 DIAGNOSIS — J82.83 EOSINOPHILIC ASTHMA: ICD-10-CM

## 2025-05-27 DIAGNOSIS — J30.9 ALLERGIC RHINITIS, UNSPECIFIED: ICD-10-CM

## 2025-05-27 DIAGNOSIS — J45.50 SEVERE PERSISTENT ASTHMA, UNCOMPLICATED: ICD-10-CM

## 2025-05-27 PROCEDURE — 99214 OFFICE O/P EST MOD 30 MIN: CPT | Mod: 25

## 2025-05-27 PROCEDURE — 94010 BREATHING CAPACITY TEST: CPT

## 2025-05-27 PROCEDURE — 95012 NITRIC OXIDE EXP GAS DETER: CPT

## 2025-05-27 PROCEDURE — 94729 DIFFUSING CAPACITY: CPT

## 2025-05-27 PROCEDURE — 94727 GAS DIL/WSHOT DETER LNG VOL: CPT

## 2025-05-27 RX ORDER — BUDESONIDE, GLYCOPYRROLATE, AND FORMOTEROL FUMARATE 160; 9; 4.8 UG/1; UG/1; UG/1
160-9-4.8 AEROSOL, METERED RESPIRATORY (INHALATION)
Qty: 1 | Refills: 5 | Status: ACTIVE | COMMUNITY
Start: 2025-05-27 | End: 1900-01-01

## 2025-05-27 RX ORDER — EPINEPHRINE 0.3 MG/.3ML
0.3 INJECTION INTRAMUSCULAR
Qty: 1 | Refills: 2 | Status: ACTIVE | COMMUNITY
Start: 2025-05-27 | End: 1900-01-01

## 2025-05-31 LAB — EOSINOPHIL # BLD MANUAL: 230 /UL

## 2025-06-04 RX ORDER — BENRALIZUMAB 30 MG/ML
30 INJECTION, SOLUTION SUBCUTANEOUS
Qty: 1 | Refills: 2 | Status: DISCONTINUED | COMMUNITY
Start: 2025-05-27 | End: 2025-06-04

## 2025-06-04 RX ORDER — BENRALIZUMAB 30 MG/ML
30 INJECTION, SOLUTION SUBCUTANEOUS
Qty: 1 | Refills: 5 | Status: DISCONTINUED | COMMUNITY
Start: 2025-05-27 | End: 2025-06-04

## 2025-06-06 ENCOUNTER — TRANSCRIPTION ENCOUNTER (OUTPATIENT)
Age: 70
End: 2025-06-06

## 2025-06-10 ENCOUNTER — TRANSCRIPTION ENCOUNTER (OUTPATIENT)
Age: 70
End: 2025-06-10

## 2025-06-12 ENCOUNTER — TRANSCRIPTION ENCOUNTER (OUTPATIENT)
Age: 70
End: 2025-06-12

## 2025-06-13 ENCOUNTER — TRANSCRIPTION ENCOUNTER (OUTPATIENT)
Age: 70
End: 2025-06-13

## 2025-06-13 RX ORDER — DUPILUMAB 300 MG/2ML
300 INJECTION, SOLUTION SUBCUTANEOUS
Qty: 1 | Refills: 0 | Status: DISCONTINUED | COMMUNITY
Start: 2025-06-04 | End: 2025-06-13

## 2025-06-13 RX ORDER — DUPILUMAB 300 MG/2ML
300 INJECTION, SOLUTION SUBCUTANEOUS
Qty: 1 | Refills: 4 | Status: DISCONTINUED | COMMUNITY
Start: 2025-06-04 | End: 2025-06-13

## 2025-06-24 ENCOUNTER — APPOINTMENT (OUTPATIENT)
Dept: INTERNAL MEDICINE | Facility: CLINIC | Age: 70
End: 2025-06-24

## 2025-06-24 ENCOUNTER — NON-APPOINTMENT (OUTPATIENT)
Age: 70
End: 2025-06-24

## 2025-06-24 ENCOUNTER — OUTPATIENT (OUTPATIENT)
Dept: OUTPATIENT SERVICES | Facility: HOSPITAL | Age: 70
LOS: 1 days | End: 2025-06-24

## 2025-06-24 DIAGNOSIS — Z98.890 OTHER SPECIFIED POSTPROCEDURAL STATES: Chronic | ICD-10-CM

## 2025-07-01 ENCOUNTER — APPOINTMENT (OUTPATIENT)
Dept: PULMONOLOGY | Facility: CLINIC | Age: 70
End: 2025-07-01
Payer: MEDICARE

## 2025-07-01 VITALS
SYSTOLIC BLOOD PRESSURE: 120 MMHG | BODY MASS INDEX: 31.58 KG/M2 | TEMPERATURE: 97.5 F | DIASTOLIC BLOOD PRESSURE: 77 MMHG | OXYGEN SATURATION: 96 % | HEIGHT: 64 IN | HEART RATE: 71 BPM | WEIGHT: 185 LBS | RESPIRATION RATE: 17 BRPM

## 2025-07-01 PROCEDURE — 98960 EDU&TRN PT SELF-MGMT NQHP 1: CPT

## 2025-08-20 ENCOUNTER — RX RENEWAL (OUTPATIENT)
Age: 70
End: 2025-08-20